# Patient Record
Sex: MALE | Race: WHITE | NOT HISPANIC OR LATINO | Employment: OTHER | URBAN - METROPOLITAN AREA
[De-identification: names, ages, dates, MRNs, and addresses within clinical notes are randomized per-mention and may not be internally consistent; named-entity substitution may affect disease eponyms.]

---

## 2023-06-08 ENCOUNTER — OFFICE VISIT (OUTPATIENT)
Dept: PRIMARY CARE | Facility: CLINIC | Age: 63
End: 2023-06-08
Payer: COMMERCIAL

## 2023-06-08 ENCOUNTER — LAB (OUTPATIENT)
Dept: LAB | Facility: LAB | Age: 63
End: 2023-06-08
Payer: COMMERCIAL

## 2023-06-08 VITALS
RESPIRATION RATE: 16 BRPM | BODY MASS INDEX: 30.62 KG/M2 | HEIGHT: 73 IN | TEMPERATURE: 98 F | WEIGHT: 231 LBS | HEART RATE: 70 BPM | SYSTOLIC BLOOD PRESSURE: 138 MMHG | DIASTOLIC BLOOD PRESSURE: 88 MMHG

## 2023-06-08 DIAGNOSIS — I10 HYPERTENSION, UNSPECIFIED TYPE: ICD-10-CM

## 2023-06-08 DIAGNOSIS — E78.5 HYPERLIPIDEMIA, UNSPECIFIED HYPERLIPIDEMIA TYPE: ICD-10-CM

## 2023-06-08 DIAGNOSIS — E11.9 TYPE 2 DIABETES MELLITUS WITHOUT COMPLICATION, UNSPECIFIED WHETHER LONG TERM INSULIN USE (MULTI): ICD-10-CM

## 2023-06-08 DIAGNOSIS — Z12.5 PROSTATE CANCER SCREENING: ICD-10-CM

## 2023-06-08 DIAGNOSIS — Z12.5 PROSTATE CANCER SCREENING: Primary | ICD-10-CM

## 2023-06-08 DIAGNOSIS — N52.9 ERECTILE DYSFUNCTION, UNSPECIFIED ERECTILE DYSFUNCTION TYPE: ICD-10-CM

## 2023-06-08 PROBLEM — I25.10 CORONARY ARTERY DISEASE: Status: ACTIVE | Noted: 2023-06-08

## 2023-06-08 PROBLEM — L25.9 CONTACT DERMATITIS: Status: ACTIVE | Noted: 2023-06-08

## 2023-06-08 PROBLEM — Z96.659 H/O TOTAL KNEE REPLACEMENT: Status: ACTIVE | Noted: 2023-06-08

## 2023-06-08 PROBLEM — I71.21 ASCENDING AORTIC ANEURYSM (CMS-HCC): Status: ACTIVE | Noted: 2023-06-08

## 2023-06-08 PROBLEM — R91.8 MULTIPLE PULMONARY NODULES: Status: ACTIVE | Noted: 2023-06-08

## 2023-06-08 LAB
ERYTHROCYTE DISTRIBUTION WIDTH (RATIO) BY AUTOMATED COUNT: 13.9 % (ref 11.5–14.5)
ERYTHROCYTE MEAN CORPUSCULAR HEMOGLOBIN CONCENTRATION (G/DL) BY AUTOMATED: 32.8 G/DL (ref 32–36)
ERYTHROCYTE MEAN CORPUSCULAR VOLUME (FL) BY AUTOMATED COUNT: 86 FL (ref 80–100)
ERYTHROCYTES (10*6/UL) IN BLOOD BY AUTOMATED COUNT: 5.15 X10E12/L (ref 4.5–5.9)
HEMATOCRIT (%) IN BLOOD BY AUTOMATED COUNT: 44.5 % (ref 41–52)
HEMOGLOBIN (G/DL) IN BLOOD: 14.6 G/DL (ref 13.5–17.5)
LEUKOCYTES (10*3/UL) IN BLOOD BY AUTOMATED COUNT: 5.9 X10E9/L (ref 4.4–11.3)
PLATELETS (10*3/UL) IN BLOOD AUTOMATED COUNT: 318 X10E9/L (ref 150–450)
POC HEMOGLOBIN A1C: 7.5 % (ref 4.2–6.5)
PROSTATE SPECIFIC AG (NG/ML) IN SER/PLAS: 1.48 NG/ML (ref 0–4)

## 2023-06-08 PROCEDURE — 80053 COMPREHEN METABOLIC PANEL: CPT

## 2023-06-08 PROCEDURE — 80061 LIPID PANEL: CPT

## 2023-06-08 PROCEDURE — 84153 ASSAY OF PSA TOTAL: CPT

## 2023-06-08 PROCEDURE — 99214 OFFICE O/P EST MOD 30 MIN: CPT | Performed by: FAMILY MEDICINE

## 2023-06-08 PROCEDURE — 3079F DIAST BP 80-89 MM HG: CPT | Performed by: FAMILY MEDICINE

## 2023-06-08 PROCEDURE — 83036 HEMOGLOBIN GLYCOSYLATED A1C: CPT | Performed by: FAMILY MEDICINE

## 2023-06-08 PROCEDURE — 85027 COMPLETE CBC AUTOMATED: CPT

## 2023-06-08 PROCEDURE — 1036F TOBACCO NON-USER: CPT | Performed by: FAMILY MEDICINE

## 2023-06-08 PROCEDURE — 36415 COLL VENOUS BLD VENIPUNCTURE: CPT

## 2023-06-08 PROCEDURE — 3075F SYST BP GE 130 - 139MM HG: CPT | Performed by: FAMILY MEDICINE

## 2023-06-08 RX ORDER — EZETIMIBE 10 MG/1
1 TABLET ORAL NIGHTLY
COMMUNITY
Start: 2014-12-09 | End: 2023-06-08 | Stop reason: SDUPTHER

## 2023-06-08 RX ORDER — DILTIAZEM HYDROCHLORIDE 360 MG/1
360 CAPSULE, EXTENDED RELEASE ORAL DAILY
Qty: 90 CAPSULE | Refills: 1 | Status: SHIPPED | OUTPATIENT
Start: 2023-06-08

## 2023-06-08 RX ORDER — METFORMIN HYDROCHLORIDE 500 MG/1
500 TABLET, EXTENDED RELEASE ORAL DAILY
Qty: 90 TABLET | Refills: 1 | Status: SHIPPED | OUTPATIENT
Start: 2023-06-08 | End: 2023-12-07 | Stop reason: SDUPTHER

## 2023-06-08 RX ORDER — FENOFIBRATE 160 MG/1
160 TABLET ORAL DAILY
Qty: 90 TABLET | Refills: 1 | Status: SHIPPED | OUTPATIENT
Start: 2023-06-08 | End: 2023-11-03 | Stop reason: ALTCHOICE

## 2023-06-08 RX ORDER — EZETIMIBE 10 MG/1
10 TABLET ORAL NIGHTLY
Qty: 90 TABLET | Refills: 1 | Status: SHIPPED | OUTPATIENT
Start: 2023-06-08 | End: 2023-11-03 | Stop reason: ALTCHOICE

## 2023-06-08 RX ORDER — FENOFIBRATE 160 MG/1
1 TABLET ORAL DAILY
COMMUNITY
Start: 2014-07-17 | End: 2023-06-08 | Stop reason: SDUPTHER

## 2023-06-08 RX ORDER — DILTIAZEM HYDROCHLORIDE 360 MG/1
1 CAPSULE, EXTENDED RELEASE ORAL DAILY
COMMUNITY
Start: 2014-12-09 | End: 2023-06-08 | Stop reason: SDUPTHER

## 2023-06-08 RX ORDER — VARDENAFIL HYDROCHLORIDE 10 MG/1
10 TABLET ORAL AS NEEDED
Qty: 18 TABLET | Refills: 1 | Status: SHIPPED | OUTPATIENT
Start: 2023-06-08

## 2023-06-08 RX ORDER — METFORMIN HYDROCHLORIDE 500 MG/1
1 TABLET, EXTENDED RELEASE ORAL DAILY
COMMUNITY
Start: 2021-07-15 | End: 2023-06-08 | Stop reason: SDUPTHER

## 2023-06-08 RX ORDER — VARDENAFIL HYDROCHLORIDE 10 MG/1
TABLET ORAL
COMMUNITY
Start: 2015-02-17 | End: 2023-06-08 | Stop reason: SDUPTHER

## 2023-06-08 NOTE — PROGRESS NOTES
Bimal Jimenez is a 63 y.o. male here today for   Chief Complaint   Patient presents with    Diabetes    Hyperlipidemia    Hypertension        HPI     Diabetes recheck -- Patient feeling well.  No CP, numbness of feet, blurred vision, polyuria.  Trying to follow diet.  No side effects from medications.  No hypoglycemic symptoms.      HTN recheck -- Patient denies chest pain, SOB, edema, palpitations on review.  Taking medication correctly and denies any side effects.  Not checking at home.      HLD recheck -- Patient taking medications correctly.  No SE's of muscle pain or joint pain.  No CP, edema, myalgias.      ED - medications works OK.  Not using it very much.      Objective    Visit Vitals  /88   Pulse 70   Temp 36.7 °C (98 °F)   Resp 16     Body mass index is 30.48 kg/m².     Physical Exam     General - Not in acute distress and cooperative.  Build & Nutrition - Well developed  Posture - Normal  Gait - Normal  Mental Status - alert and oriented x 3    Head - Normocephalic    Neck - Thyroid normal size    Eyes - Bilateral - Sclera clear and lids pink without edema or mass.      Skin - Warm and dry with no rashes on visible skin    Lungs - Clear to auscultation and normal breathing effort    Cardiovascular - RRR and no murmurs, rubs or thrill.    Peripheral Vascular - Bilateral - no edema present    Neuropsychiatric - normal mood and affect      Assessment    1. Prostate cancer screening  Prostate Specific Antigen   Appropriate labs ordered or reviewed.   2. Type 2 diabetes mellitus without complication, unspecified whether long term insulin use (CMS/[facility])  POCT glycosylated hemoglobin (Hb A1C) manually resulted, metFORMIN XR (Glucophage-XR) 500 mg 24 hr tablet, Comprehensive Metabolic Panel, CBC, Lipid Panel   Condition well controlled.  No change in current treatment regimen.  Refill given of current medication.  Appropriate labs ordered or reviewed.  Make a follow up appointment with me for recheck in 6  months.   3. Hypertension, unspecified type  dilTIAZem ER (Tiazac) 360 mg 24 hr capsule, Comprehensive Metabolic Panel, CBC, Lipid Panel   Condition well controlled.  No change in current treatment regimen.  Refill given of current medication.  Appropriate labs ordered or reviewed.  Make a follow up appointment with me for recheck in 6 months.   4. Hyperlipidemia, unspecified hyperlipidemia type  ezetimibe (Zetia) 10 mg tablet, fenofibrate (Triglide) 160 mg tablet, Comprehensive Metabolic Panel, CBC, Lipid Panel   Condition well controlled.  No change in current treatment regimen.  Refill given of current medication.  Appropriate labs ordered or reviewed.  Make a follow up appointment with me for recheck in 6 months.   5. Erectile dysfunction, unspecified erectile dysfunction type  vardenafil (Levitra) 10 mg tablet   Condition well controlled.  No change in current treatment regimen.  Refill given of current medication.  Make a follow up appointment with me for recheck in 6 months.

## 2023-06-09 ENCOUNTER — TELEPHONE (OUTPATIENT)
Dept: PRIMARY CARE | Facility: CLINIC | Age: 63
End: 2023-06-09
Payer: COMMERCIAL

## 2023-06-09 LAB
ALANINE AMINOTRANSFERASE (SGPT) (U/L) IN SER/PLAS: 20 U/L (ref 10–52)
ALBUMIN (G/DL) IN SER/PLAS: 4.7 G/DL (ref 3.4–5)
ALKALINE PHOSPHATASE (U/L) IN SER/PLAS: 47 U/L (ref 33–136)
ANION GAP IN SER/PLAS: 14 MMOL/L (ref 10–20)
ASPARTATE AMINOTRANSFERASE (SGOT) (U/L) IN SER/PLAS: 20 U/L (ref 9–39)
BILIRUBIN TOTAL (MG/DL) IN SER/PLAS: 0.5 MG/DL (ref 0–1.2)
CALCIUM (MG/DL) IN SER/PLAS: 9.8 MG/DL (ref 8.6–10.3)
CARBON DIOXIDE, TOTAL (MMOL/L) IN SER/PLAS: 20 MMOL/L (ref 21–32)
CHLORIDE (MMOL/L) IN SER/PLAS: 106 MMOL/L (ref 98–107)
CHOLESTEROL (MG/DL) IN SER/PLAS: 187 MG/DL (ref 0–199)
CHOLESTEROL IN HDL (MG/DL) IN SER/PLAS: 53.6 MG/DL
CHOLESTEROL/HDL RATIO: 3.5
CREATININE (MG/DL) IN SER/PLAS: 0.93 MG/DL (ref 0.5–1.3)
GFR MALE: >90 ML/MIN/1.73M2
GLUCOSE (MG/DL) IN SER/PLAS: 130 MG/DL (ref 74–99)
LDL: 105 MG/DL (ref 0–99)
POTASSIUM (MMOL/L) IN SER/PLAS: 4.3 MMOL/L (ref 3.5–5.3)
PROTEIN TOTAL: 7.3 G/DL (ref 6.4–8.2)
SODIUM (MMOL/L) IN SER/PLAS: 136 MMOL/L (ref 136–145)
TRIGLYCERIDE (MG/DL) IN SER/PLAS: 140 MG/DL (ref 0–149)
UREA NITROGEN (MG/DL) IN SER/PLAS: 19 MG/DL (ref 6–23)
VLDL: 28 MG/DL (ref 0–40)

## 2023-06-09 NOTE — TELEPHONE ENCOUNTER
----- Message from Marco A Wesley MD sent at 6/9/2023  7:39 AM EDT -----  Inform patient of normal results of all recent labs.   Even though some of the lab values may fall outside of the normal range, I do not feel they are clinically concerning or relevant at this time.

## 2023-06-09 NOTE — RESULT ENCOUNTER NOTE
Inform patient of normal results of all recent labs.   Even though some of the lab values may fall outside of the normal range, I do not feel they are clinically concerning or relevant at this time.

## 2023-09-30 ASSESSMENT — PROMIS GLOBAL HEALTH SCALE
RATE_GENERAL_HEALTH: VERY GOOD
CARRYOUT_PHYSICAL_ACTIVITIES: COMPLETELY
RATE_QUALITY_OF_LIFE: VERY GOOD
RATE_MENTAL_HEALTH: EXCELLENT
CARRYOUT_SOCIAL_ACTIVITIES: EXCELLENT
RATE_SOCIAL_SATISFACTION: EXCELLENT
RATE_AVERAGE_PAIN: 1
RATE_AVERAGE_FATIGUE: MILD
RATE_PHYSICAL_HEALTH: GOOD
EMOTIONAL_PROBLEMS: NEVER

## 2023-10-05 ENCOUNTER — OFFICE VISIT (OUTPATIENT)
Dept: PRIMARY CARE | Facility: CLINIC | Age: 63
End: 2023-10-05
Payer: COMMERCIAL

## 2023-10-05 VITALS
WEIGHT: 231 LBS | RESPIRATION RATE: 16 BRPM | OXYGEN SATURATION: 96 % | TEMPERATURE: 98 F | SYSTOLIC BLOOD PRESSURE: 128 MMHG | HEART RATE: 78 BPM | BODY MASS INDEX: 30.62 KG/M2 | HEIGHT: 73 IN | DIASTOLIC BLOOD PRESSURE: 82 MMHG

## 2023-10-05 DIAGNOSIS — Z00.00 ENCOUNTER FOR PREVENTIVE HEALTH EXAMINATION: Primary | ICD-10-CM

## 2023-10-05 DIAGNOSIS — R91.8 MULTIPLE PULMONARY NODULES: ICD-10-CM

## 2023-10-05 PROBLEM — E11.9 TYPE 2 DIABETES MELLITUS WITHOUT COMPLICATION, WITHOUT LONG-TERM CURRENT USE OF INSULIN (MULTI): Status: RESOLVED | Noted: 2023-06-08 | Resolved: 2023-10-05

## 2023-10-05 PROBLEM — E78.2 MIXED HYPERLIPIDEMIA: Status: ACTIVE | Noted: 2023-06-08

## 2023-10-05 PROCEDURE — 3079F DIAST BP 80-89 MM HG: CPT | Performed by: FAMILY MEDICINE

## 2023-10-05 PROCEDURE — 99396 PREV VISIT EST AGE 40-64: CPT | Performed by: FAMILY MEDICINE

## 2023-10-05 PROCEDURE — 1036F TOBACCO NON-USER: CPT | Performed by: FAMILY MEDICINE

## 2023-10-05 PROCEDURE — 3074F SYST BP LT 130 MM HG: CPT | Performed by: FAMILY MEDICINE

## 2023-10-05 ASSESSMENT — PATIENT HEALTH QUESTIONNAIRE - PHQ9
SUM OF ALL RESPONSES TO PHQ9 QUESTIONS 1 AND 2: 0
2. FEELING DOWN, DEPRESSED OR HOPELESS: NOT AT ALL
1. LITTLE INTEREST OR PLEASURE IN DOING THINGS: NOT AT ALL

## 2023-10-05 NOTE — PROGRESS NOTES
Bimal Jimenez is a 63 y.o. male here today a periodic health exam.  I reviewed previous preventative health measures including screening tests, immunizations and labs.      HPI   CURRENT COMPLAINTS OR CONCERNS:  None.        PREVIOUS PREVENTATIVE HEALTH  PSA :  YES -- DATE 6/8/2023  Colonoscopy : YES -- DATE 4/24/2019  Cologuard :  NO  Hepatitis C Antibody :  NO  HIV Screening : NO  Prevnar/Pneumovax : YES -- DATE 11/25/2015 Pneumovax 23  Tdap : YES -- DATE 11/25/2015    Shingrix : YES -- DATE 8/21/2022 and 11/13/2022  Yearly Flu Shot :  YES    CURRENT FINDINGS  Healthy Diet : YES  Exercise :  YES --  very busy with yard work, walks a lot.   Bikes.    Depression or Anxiety Issues : NO  Alcohol Use : NO  Tobacco Use : NO.  Quit in 2002.    Drug Use : NO          Current Outpatient Medications:     dilTIAZem ER (Tiazac) 360 mg 24 hr capsule, Take 1 capsule (360 mg) by mouth once daily., Disp: 90 capsule, Rfl: 1    ezetimibe (Zetia) 10 mg tablet, Take 1 tablet (10 mg) by mouth once daily at bedtime., Disp: 90 tablet, Rfl: 1    fenofibrate (Triglide) 160 mg tablet, Take 1 tablet (160 mg) by mouth once daily., Disp: 90 tablet, Rfl: 1    metFORMIN XR (Glucophage-XR) 500 mg 24 hr tablet, Take 1 tablet (500 mg) by mouth once daily., Disp: 90 tablet, Rfl: 1    vardenafil (Levitra) 10 mg tablet, Take 1 tablet (10 mg) by mouth if needed for erectile dysfunction., Disp: 18 tablet, Rfl: 1    Past Medical History:   Diagnosis Date    Calculus of bile duct without cholangitis or cholecystitis without obstruction 03/03/2017    Biliary colic    Other specified postprocedural states     Status post hernia repair    Personal history of other diseases of the circulatory system     History of cardiac disorder    Personal history of other diseases of the musculoskeletal system and connective tissue     History of arthritis    Personal history of other diseases of the musculoskeletal system and connective tissue     History of backache     Personal history of other infectious and parasitic diseases     History of varicella    Personal history of other infectious and parasitic diseases     History of mumps    Personal history of other infectious and parasitic diseases     History of Legionnaire's disease    Personal history of other infectious and parasitic diseases     History of measles    Personal history of pneumonia (recurrent)     History of pneumonia    Personal history of pulmonary embolism     History of pulmonary embolism    Umbilical hernia without obstruction or gangrene 12/07/2015    Umbilical hernia       Past Surgical History:   Procedure Laterality Date    BACK SURGERY  03/03/2017    Back Surgery    CHOLECYSTECTOMY  06/07/2022    Cholecystectomy Laparoscopic    KNEE SURGERY  03/03/2017    Knee Surgery    OTHER SURGICAL HISTORY  01/21/2016    Umbilical Hernia Herniorrhaphy    SHOULDER SURGERY  03/03/2017    Shoulder Surgery       No family history on file.    Social History     Tobacco Use    Smoking status: Former     Types: Cigarettes     Passive exposure: Past    Smokeless tobacco: Never   Substance Use Topics    Alcohol use: Never    Drug use: Never       Immunization History   Administered Date(s) Administered    Influenza, injectable, MDCK, quadrivalent 10/12/2022    Influenza, seasonal, injectable 10/17/2018, 06/03/2021    Mumps 11/01/2015    Pfizer Purple Cap SARS-CoV-2 02/19/2021, 03/12/2021, 12/15/2021    Pneumococcal polysaccharide vaccine, 23-valent, age 2 years and older (PNEUMOVAX 23) 11/25/2015    Tdap vaccine, age 7 year and older (BOOSTRIX) 11/25/2015    Zoster vaccine, recombinant, adult (SHINGRIX) 08/21/2022, 11/13/2022         Objective    Visit Vitals  /82   Pulse 78   Temp 36.7 °C (98 °F)   Resp 16       Physical Exam  Vitals and nursing note reviewed.   Constitutional:       General: He is not in acute distress.     Appearance: Normal appearance.   HENT:      Head: Normocephalic and atraumatic.      Right  Ear: Tympanic membrane, ear canal and external ear normal.      Left Ear: Tympanic membrane, ear canal and external ear normal.      Nose: Nose normal.      Mouth/Throat:      Mouth: Mucous membranes are moist.      Pharynx: Oropharynx is clear.   Eyes:      Extraocular Movements: Extraocular movements intact.      Conjunctiva/sclera: Conjunctivae normal.      Pupils: Pupils are equal, round, and reactive to light.   Cardiovascular:      Rate and Rhythm: Normal rate and regular rhythm.      Pulses: Normal pulses.      Heart sounds: Normal heart sounds. No murmur heard.     No friction rub. No gallop.   Pulmonary:      Effort: Pulmonary effort is normal. No respiratory distress.      Breath sounds: Normal breath sounds.   Abdominal:      General: Abdomen is flat. Bowel sounds are normal. There is no distension.      Palpations: Abdomen is soft.      Tenderness: There is no abdominal tenderness.   Musculoskeletal:         General: Normal range of motion.      Cervical back: Normal range of motion and neck supple.   Lymphadenopathy:      Cervical: No cervical adenopathy.   Skin:     General: Skin is warm and dry.      Findings: No lesion or rash.   Neurological:      General: No focal deficit present.      Mental Status: He is alert. Mental status is at baseline.   Psychiatric:         Mood and Affect: Mood normal.         Behavior: Behavior normal.         Thought Content: Thought content normal.         Judgment: Judgment normal.            Assessment    1. Encounter for preventive health examination     Recommend regular exercise, balanced diet, regular dental exams, and healthy habits.  Appropriate labs ordered or reviewed.  I recommend to eat plenty of plant foods (such as whole-grain products, fruits, and vegetables) and a moderate amount of lean and low-fat, animal-based food (meat and dairy products).  When shopping, choose lean meats, fish, and poultry. I recommend to increase aerobic exercise.  He already  received a flu shot this year.  His other immunizations are up-to-date.  He had a colonoscopy on 4/24/2019 and this was scanned into his chart.  I recommend a yearly flu shot in the fall and I recommend a yearly wellness exam.     2. Multiple pulmonary nodules  CT lung screening follow up CT chest wo IV contrast   His last CT scan was in 2021 so he is due for a recheck of a few small pulmonary nodules.  I have ordered a follow-up CT lung screening.  We will call him with results.

## 2023-10-09 ENCOUNTER — APPOINTMENT (OUTPATIENT)
Dept: RADIOLOGY | Facility: CLINIC | Age: 63
End: 2023-10-09
Payer: COMMERCIAL

## 2023-10-10 ENCOUNTER — ANCILLARY PROCEDURE (OUTPATIENT)
Dept: RADIOLOGY | Facility: CLINIC | Age: 63
End: 2023-10-10
Payer: COMMERCIAL

## 2023-10-10 DIAGNOSIS — R91.8 MULTIPLE PULMONARY NODULES: ICD-10-CM

## 2023-10-10 PROCEDURE — 71250 CT THORAX DX C-: CPT

## 2023-10-10 PROCEDURE — 71250 CT THORAX DX C-: CPT | Performed by: RADIOLOGY

## 2023-10-11 ENCOUNTER — TELEPHONE (OUTPATIENT)
Dept: PRIMARY CARE | Facility: CLINIC | Age: 63
End: 2023-10-11
Payer: COMMERCIAL

## 2023-10-11 NOTE — PROGRESS NOTES
Bimal Jimenez is a 63 y.o. male here today for No chief complaint on file.       HPI         Current Outpatient Medications:     dilTIAZem ER (Tiazac) 360 mg 24 hr capsule, Take 1 capsule (360 mg) by mouth once daily., Disp: 90 capsule, Rfl: 1    ezetimibe (Zetia) 10 mg tablet, Take 1 tablet (10 mg) by mouth once daily at bedtime., Disp: 90 tablet, Rfl: 1    fenofibrate (Triglide) 160 mg tablet, Take 1 tablet (160 mg) by mouth once daily., Disp: 90 tablet, Rfl: 1    metFORMIN XR (Glucophage-XR) 500 mg 24 hr tablet, Take 1 tablet (500 mg) by mouth once daily., Disp: 90 tablet, Rfl: 1    vardenafil (Levitra) 10 mg tablet, Take 1 tablet (10 mg) by mouth if needed for erectile dysfunction., Disp: 18 tablet, Rfl: 1    Patient Active Problem List   Diagnosis    Ascending aortic aneurysm (CMS/HCC)    Contact dermatitis    Coronary artery disease    Erectile dysfunction    H/O total knee replacement    Mixed hyperlipidemia    Primary hypertension    Multiple pulmonary nodules    Prostate cancer screening         No results found for this or any previous visit (from the past 672 hour(s)).     Objective    Visit Vitals  There were no vitals taken for this visit.    There is no height or weight on file to calculate BMI.     Physical Exam       Assessment    No diagnosis found.

## 2023-10-11 NOTE — TELEPHONE ENCOUNTER
Pt was informed and understood. Pt stated he was concerned to see severe coronary artery calcification in the report. Advise.

## 2023-10-11 NOTE — RESULT ENCOUNTER NOTE
Please inform the patient that his repeat CT scan of the lungs was stable.  The previous nodules have not changed or grown in size and they recommend that we repeat his CT scan in 1 year to make sure there are no changes.

## 2023-11-03 ENCOUNTER — TELEPHONE (OUTPATIENT)
Dept: CARDIOLOGY | Facility: HOSPITAL | Age: 63
End: 2023-11-03

## 2023-11-03 ENCOUNTER — OFFICE VISIT (OUTPATIENT)
Dept: CARDIOLOGY | Facility: CLINIC | Age: 63
End: 2023-11-03
Payer: COMMERCIAL

## 2023-11-03 VITALS
OXYGEN SATURATION: 96 % | WEIGHT: 228 LBS | SYSTOLIC BLOOD PRESSURE: 150 MMHG | HEART RATE: 83 BPM | BODY MASS INDEX: 30.22 KG/M2 | HEIGHT: 73 IN | DIASTOLIC BLOOD PRESSURE: 88 MMHG

## 2023-11-03 DIAGNOSIS — I10 PRIMARY HYPERTENSION: ICD-10-CM

## 2023-11-03 DIAGNOSIS — E78.2 MIXED HYPERLIPIDEMIA: ICD-10-CM

## 2023-11-03 DIAGNOSIS — I25.118 CORONARY ARTERY DISEASE OF NATIVE ARTERY OF NATIVE HEART WITH STABLE ANGINA PECTORIS (CMS-HCC): Primary | ICD-10-CM

## 2023-11-03 PROCEDURE — 93005 ELECTROCARDIOGRAM TRACING: CPT | Performed by: INTERNAL MEDICINE

## 2023-11-03 PROCEDURE — 3077F SYST BP >= 140 MM HG: CPT | Performed by: INTERNAL MEDICINE

## 2023-11-03 PROCEDURE — 99204 OFFICE O/P NEW MOD 45 MIN: CPT | Performed by: INTERNAL MEDICINE

## 2023-11-03 PROCEDURE — 3079F DIAST BP 80-89 MM HG: CPT | Performed by: INTERNAL MEDICINE

## 2023-11-03 PROCEDURE — 93010 ELECTROCARDIOGRAM REPORT: CPT | Performed by: INTERNAL MEDICINE

## 2023-11-03 PROCEDURE — 99214 OFFICE O/P EST MOD 30 MIN: CPT | Performed by: INTERNAL MEDICINE

## 2023-11-03 PROCEDURE — 1036F TOBACCO NON-USER: CPT | Performed by: INTERNAL MEDICINE

## 2023-11-03 RX ORDER — NAPROXEN SODIUM 220 MG/1
81 TABLET, FILM COATED ORAL DAILY
Qty: 30 TABLET | Refills: 11 | Status: SHIPPED | OUTPATIENT
Start: 2023-11-03 | End: 2024-11-02

## 2023-11-03 RX ORDER — ROSUVASTATIN CALCIUM 20 MG/1
20 TABLET, COATED ORAL DAILY
Qty: 30 TABLET | Refills: 11 | Status: SHIPPED | OUTPATIENT
Start: 2023-11-03 | End: 2023-11-24 | Stop reason: SDUPTHER

## 2023-11-03 RX ORDER — ATENOLOL 100 MG/1
100 TABLET ORAL ONCE
Qty: 1 TABLET | Refills: 0 | Status: SHIPPED | OUTPATIENT
Start: 2023-11-03 | End: 2023-11-27 | Stop reason: ALTCHOICE

## 2023-11-03 ASSESSMENT — PAIN SCALES - GENERAL: PAINLEVEL: 0-NO PAIN

## 2023-11-03 ASSESSMENT — ENCOUNTER SYMPTOMS
OCCASIONAL FEELINGS OF UNSTEADINESS: 0
DEPRESSION: 0
LOSS OF SENSATION IN FEET: 1

## 2023-11-03 NOTE — PROGRESS NOTES
Name : Ryan Jimenez    : 1960   MRN : [id number]   ENC Date : 23     Reason for visit:  chest pain and severe coronary calcium seen on chest CT.    Assessment and Plan:    Coronary artery disease: Patient has some atypical chest pain but significant coronary artery calcification noted on chest CT  done for monitoring of pulmonary nodules.  He is unable to exercise due to prior knee surgery.  Therefore I think the next best test would be a coronary CTA to definitively define any potential area of stenosis.  Patient was agreeable with that plan.  He will take atenolol the morning of the procedure but hold his diltiazem.  He will have his blood work checked prior to the procedure.   We reviewed the change in guidelines regarding aspirin for primary prevention of MI and stroke.  Given the extensive nature of his calcification and no prior bleeding episodes I think aspirin 81 mg daily provides benefit above and beyond any potential risk.  I recommended he begin this.  Dyslipidemia: Patient had some intolerance to atorvastatin.  Given the extensive calcification I really think we should try him on another statin before saying he cannot take these medications.  There is no outcomes data with fenofibrate or Zetia and I really believe we should try to see if he can tolerate a statin.  He was agreeable to that.  I discontinued those medications and started him on rosuvastatin 20 mg daily.  If he is unable to tolerate rosuvastatin we should look at a PCS K-9 inhibitor therapy instead.  Hypertension: Blood pressure was slightly elevated today but is typically under good control.  We will need to recheck this in the future.   Given he has had an elevated hemoglobin A1c and is on metformin it would probably be wise to add an ACE inhibitor.  Disp:   Phone follow-up regarding coronary CTA results or see any office if we need to discuss  cardiac catheterization.      HPI:    Patient is seen today for evaluation of  significant coronary calcification noted at the time of a lung nodule follow-up CT scan.  Patient describes some mild chest fullness from time to time.  He also describes his wife telling him he breathes heavy from time to time.  No real significant burning stabbing or crushing chest discomfort.  He is able to do most activities at home.  Unfortunately he has had prior knee surgery and is unable to run/exercise on a treadmill.  he apparently had a heart catheterization done several years ago which showed some mild plaque disease.  At some point he was placed on atorvastatin and did not tolerate it but cannot relate remember exactly what the intolerance was.  He was switched to fenofibrate and Zetia and has been on that for several years.  He was never tried on another statin as far as he can recall.  No TIA or CVA-like symptoms.  No orthopnea nor PND.  No claudication-like symptoms.      Problem List:   Patient Active Problem List   Diagnosis    Ascending aortic aneurysm (CMS/HCC)    Contact dermatitis    Coronary artery disease    Erectile dysfunction    H/O total knee replacement    Mixed hyperlipidemia    Primary hypertension    Multiple pulmonary nodules    Prostate cancer screening        Meds:   Current Outpatient Medications on File Prior to Visit   Medication Sig Dispense Refill    dilTIAZem ER (Tiazac) 360 mg 24 hr capsule Take 1 capsule (360 mg) by mouth once daily. 90 capsule 1    metFORMIN XR (Glucophage-XR) 500 mg 24 hr tablet Take 1 tablet (500 mg) by mouth once daily. 90 tablet 1    vardenafil (Levitra) 10 mg tablet Take 1 tablet (10 mg) by mouth if needed for erectile dysfunction. 18 tablet 1    [DISCONTINUED] ezetimibe (Zetia) 10 mg tablet Take 1 tablet (10 mg) by mouth once daily at bedtime. 90 tablet 1    [DISCONTINUED] fenofibrate (Triglide) 160 mg tablet Take 1 tablet (160 mg) by mouth once daily. 90 tablet 1     No current facility-administered medications on file prior to visit.       All:  "  Allergies   Allergen Reactions    Bee Pollen Unknown       Fam Hx: No family history on file.    Soc Hx:   Social History     Socioeconomic History    Marital status:      Spouse name: Not on file    Number of children: Not on file    Years of education: Not on file    Highest education level: Not on file   Occupational History    Not on file   Tobacco Use    Smoking status: Former     Types: Cigarettes     Passive exposure: Past    Smokeless tobacco: Never   Substance and Sexual Activity    Alcohol use: Never    Drug use: Never    Sexual activity: Not on file   Other Topics Concern    Not on file   Social History Narrative    Not on file     Social Determinants of Health     Financial Resource Strain: Not on file   Food Insecurity: Not on file   Transportation Needs: Not on file   Physical Activity: Not on file   Stress: Not on file   Social Connections: Not on file   Intimate Partner Violence: Not on file   Housing Stability: Not on file       ROS    VS: /88 (BP Location: Left arm, Patient Position: Sitting)   Pulse 83   Ht 1.854 m (6' 1\")   Wt 103 kg (228 lb)   SpO2 96%   BMI 30.08 kg/m²      Physical Exam     No results found for this or any previous visit from the past 1095 days.     No echocardiogram results found for the past 12 months  No results found for this or any previous visit (from the past 4464 hour(s)).         Kvng Alonzo MD   " Walk in

## 2023-11-07 LAB
ATRIAL RATE: 74 BPM
P AXIS: -13 DEGREES
P OFFSET: 171 MS
P ONSET: 138 MS
PR INTERVAL: 148 MS
Q ONSET: 212 MS
QRS COUNT: 12 BEATS
QRS DURATION: 90 MS
QT INTERVAL: 382 MS
QTC CALCULATION(BAZETT): 424 MS
QTC FREDERICIA: 410 MS
R AXIS: -32 DEGREES
T AXIS: 10 DEGREES
T OFFSET: 403 MS
VENTRICULAR RATE: 74 BPM

## 2023-11-21 ENCOUNTER — LAB (OUTPATIENT)
Dept: LAB | Facility: LAB | Age: 63
End: 2023-11-21
Payer: COMMERCIAL

## 2023-11-21 DIAGNOSIS — I25.118 CORONARY ARTERY DISEASE OF NATIVE ARTERY OF NATIVE HEART WITH STABLE ANGINA PECTORIS (CMS-HCC): ICD-10-CM

## 2023-11-21 LAB
ANION GAP SERPL CALC-SCNC: 13 MMOL/L (ref 10–20)
BUN SERPL-MCNC: 13 MG/DL (ref 6–23)
CALCIUM SERPL-MCNC: 9.6 MG/DL (ref 8.6–10.3)
CHLORIDE SERPL-SCNC: 107 MMOL/L (ref 98–107)
CO2 SERPL-SCNC: 22 MMOL/L (ref 21–32)
CREAT SERPL-MCNC: 0.83 MG/DL (ref 0.5–1.3)
GFR SERPL CREATININE-BSD FRML MDRD: >90 ML/MIN/1.73M*2
GLUCOSE SERPL-MCNC: 146 MG/DL (ref 74–99)
POTASSIUM SERPL-SCNC: 4.4 MMOL/L (ref 3.5–5.3)
SODIUM SERPL-SCNC: 138 MMOL/L (ref 136–145)

## 2023-11-21 PROCEDURE — 80048 BASIC METABOLIC PNL TOTAL CA: CPT

## 2023-11-21 PROCEDURE — 36415 COLL VENOUS BLD VENIPUNCTURE: CPT

## 2023-11-24 ENCOUNTER — ANCILLARY PROCEDURE (OUTPATIENT)
Dept: RADIOLOGY | Facility: CLINIC | Age: 63
End: 2023-11-24
Payer: COMMERCIAL

## 2023-11-24 VITALS
HEIGHT: 73 IN | DIASTOLIC BLOOD PRESSURE: 68 MMHG | BODY MASS INDEX: 30.22 KG/M2 | RESPIRATION RATE: 16 BRPM | SYSTOLIC BLOOD PRESSURE: 134 MMHG | WEIGHT: 228 LBS | OXYGEN SATURATION: 98 % | HEART RATE: 59 BPM

## 2023-11-24 DIAGNOSIS — I25.118 CORONARY ARTERY DISEASE OF NATIVE ARTERY OF NATIVE HEART WITH STABLE ANGINA PECTORIS (CMS-HCC): ICD-10-CM

## 2023-11-24 PROCEDURE — 75574 CT ANGIO HRT W/3D IMAGE: CPT | Performed by: INTERNAL MEDICINE

## 2023-11-24 PROCEDURE — 2550000001 HC RX 255 CONTRASTS: Performed by: INTERNAL MEDICINE

## 2023-11-24 PROCEDURE — 2500000001 HC RX 250 WO HCPCS SELF ADMINISTERED DRUGS (ALT 637 FOR MEDICARE OP)

## 2023-11-24 PROCEDURE — 75574 CT ANGIO HRT W/3D IMAGE: CPT

## 2023-11-24 RX ORDER — NITROGLYCERIN 400 UG/1
2 SPRAY ORAL ONCE
Status: COMPLETED | OUTPATIENT
Start: 2023-11-24 | End: 2023-11-24

## 2023-11-24 RX ADMIN — NITROGLYCERIN 2 SPRAY: 400 SPRAY ORAL at 08:09

## 2023-11-24 RX ADMIN — IOHEXOL 80 ML: 350 INJECTION, SOLUTION INTRAVENOUS at 08:33

## 2023-11-24 NOTE — NURSING NOTE
Post CCTA pt c/o slight dizziness that  improved with caffeine, denies headache. Steady on feet, skin warm pink and dry. Pt verbalized understanding of increased water intake x24 hours, educated on side effects of medications. HL removed with tip intact, manual pressure held until hemostasis achieved, 2x2 and coban to site. Pt DC home to self care with his wife.

## 2023-11-27 ENCOUNTER — TELEPHONE (OUTPATIENT)
Dept: CARDIOLOGY | Facility: HOSPITAL | Age: 63
End: 2023-11-27
Payer: COMMERCIAL

## 2023-11-27 RX ORDER — ROSUVASTATIN CALCIUM 20 MG/1
20 TABLET, COATED ORAL DAILY
Qty: 90 TABLET | Refills: 3 | Status: SHIPPED | OUTPATIENT
Start: 2023-11-27 | End: 2024-11-26

## 2023-11-27 NOTE — TELEPHONE ENCOUNTER
I called patient with the results of his coronary CTA.  It shows mild diffuse coronary artery disease with a moderately elevated coronary calcium score.  Cardiac catheterization is not warranted.  He is having some muscle cramps with rosuvastatin.  I told him to stick with it.  He can try hydrating himself before bed as well as taking some over-the-counter magnesium.  Hopefully he will tolerate it well.  If he does not then we might have to consider PCS K-9 inhibitor therapy.    I recommended the Mediterranean diet for long-term cardiovascular care.    Marci please schedule RTO in 6 months, then save to chart    SDH

## 2023-12-07 ENCOUNTER — OFFICE VISIT (OUTPATIENT)
Dept: PRIMARY CARE | Facility: CLINIC | Age: 63
End: 2023-12-07
Payer: COMMERCIAL

## 2023-12-07 VITALS
WEIGHT: 231 LBS | RESPIRATION RATE: 16 BRPM | TEMPERATURE: 98 F | DIASTOLIC BLOOD PRESSURE: 82 MMHG | HEART RATE: 72 BPM | BODY MASS INDEX: 30.62 KG/M2 | HEIGHT: 73 IN | SYSTOLIC BLOOD PRESSURE: 128 MMHG

## 2023-12-07 DIAGNOSIS — E78.2 MIXED HYPERLIPIDEMIA: Primary | ICD-10-CM

## 2023-12-07 DIAGNOSIS — E11.9 TYPE 2 DIABETES MELLITUS WITHOUT COMPLICATION, WITHOUT LONG-TERM CURRENT USE OF INSULIN (MULTI): ICD-10-CM

## 2023-12-07 DIAGNOSIS — I10 PRIMARY HYPERTENSION: ICD-10-CM

## 2023-12-07 LAB — POC HEMOGLOBIN A1C: 7.9 % (ref 4.2–6.5)

## 2023-12-07 PROCEDURE — 99214 OFFICE O/P EST MOD 30 MIN: CPT | Performed by: FAMILY MEDICINE

## 2023-12-07 PROCEDURE — 1036F TOBACCO NON-USER: CPT | Performed by: FAMILY MEDICINE

## 2023-12-07 PROCEDURE — 3074F SYST BP LT 130 MM HG: CPT | Performed by: FAMILY MEDICINE

## 2023-12-07 PROCEDURE — 83036 HEMOGLOBIN GLYCOSYLATED A1C: CPT | Performed by: FAMILY MEDICINE

## 2023-12-07 PROCEDURE — 3079F DIAST BP 80-89 MM HG: CPT | Performed by: FAMILY MEDICINE

## 2023-12-07 RX ORDER — METFORMIN HYDROCHLORIDE 500 MG/1
500 TABLET, EXTENDED RELEASE ORAL
Qty: 180 TABLET | Refills: 1 | Status: SHIPPED | OUTPATIENT
Start: 2023-12-07

## 2023-12-07 NOTE — PROGRESS NOTES
"Ryan Jimenez \"Ivan" is a 63 y.o. male here today for   Chief Complaint   Patient presents with    Hypertension    Diabetes        HPI   Recheck hyperlipidemia and abnormal calcifications on CT of coronary arteries - He saw Dr. Alonzo and switched to rosuvastatin and d/carl Zetia and fenofibrate.  No problems at all with rosuvastatin.  May do cardiac CT in 6 months.  I reviewed the note from Dr. Alonzo.    Diabetes recheck -- Patient feeling well.  No CP, numbness of feet, blurred vision, polyuria.  Trying to follow diet.  No side effects from medications.  No hypoglycemic symptoms.   A1C is higher today at 7.9.  He says he has been eating more carbohydrates and sweets.  His weight has increased slightly over the last few months.      Current Outpatient Medications:     aspirin 81 mg chewable tablet, Chew 1 tablet (81 mg) once daily., Disp: 30 tablet, Rfl: 11    dilTIAZem ER (Tiazac) 360 mg 24 hr capsule, Take 1 capsule (360 mg) by mouth once daily., Disp: 90 capsule, Rfl: 1    rosuvastatin (Crestor) 20 mg tablet, Take 1 tablet (20 mg) by mouth once daily., Disp: 90 tablet, Rfl: 3    vardenafil (Levitra) 10 mg tablet, Take 1 tablet (10 mg) by mouth if needed for erectile dysfunction., Disp: 18 tablet, Rfl: 1    metFORMIN  mg 24 hr tablet, Take 1 tablet (500 mg) by mouth 2 times a day with meals., Disp: 180 tablet, Rfl: 1    Patient Active Problem List   Diagnosis    Ascending aortic aneurysm (CMS/HCC)    Contact dermatitis    Coronary artery disease    Type 2 diabetes mellitus without complication, without long-term current use of insulin (CMS/HCC)    Erectile dysfunction    H/O total knee replacement    Mixed hyperlipidemia    Primary hypertension    Multiple pulmonary nodules    Prostate cancer screening         Recent Results (from the past 672 hour(s))   Basic Metabolic Panel    Collection Time: 11/21/23  9:50 AM   Result Value Ref Range    Glucose 146 (H) 74 - 99 mg/dL    Sodium 138 136 - 145 mmol/L    " "Potassium 4.4 3.5 - 5.3 mmol/L    Chloride 107 98 - 107 mmol/L    Bicarbonate 22 21 - 32 mmol/L    Anion Gap 13 10 - 20 mmol/L    Urea Nitrogen 13 6 - 23 mg/dL    Creatinine 0.83 0.50 - 1.30 mg/dL    eGFR >90 >60 mL/min/1.73m*2    Calcium 9.6 8.6 - 10.3 mg/dL   POCT glycosylated hemoglobin (Hb A1C) manually resulted    Collection Time: 12/07/23  8:24 AM   Result Value Ref Range    POC HEMOGLOBIN A1c 7.9 (A) 4.2 - 6.5 %        Objective    Visit Vitals  /82   Pulse 72   Temp 36.7 °C (98 °F)   Resp 16   Ht 1.854 m (6' 1\")   Wt 105 kg (231 lb)   BMI 30.48 kg/m²     Body mass index is 30.48 kg/m².     Physical Exam   General - Not in acute distress and cooperative.  Build & Nutrition - Well developed  Posture - Normal  Gait - Normal  Mental Status - alert and oriented x 3    Head - Normocephalic    Neck - Thyroid normal size    Eyes - Bilateral - Sclera clear and lids pink without edema or mass.      Skin - Warm and dry with no rashes on visible skin    Lungs - Clear to auscultation and normal breathing effort    Cardiovascular - RRR and no murmurs, rubs or thrill.    Peripheral Vascular - Bilateral - no edema present    Neuropsychiatric - normal mood and affect        Assessment    1. Mixed hyperlipidemia     He was switched to rosuvastatin by Dr. Alonzo as above.  He is tolerating it well and he will get follow-up labs in the future.     2. Type 2 diabetes mellitus without complication, without long-term current use of insulin (CMS/[facility])  POCT glycosylated hemoglobin (Hb A1C) manually resulted, metFORMIN  mg 24 hr tablet   Suboptimal control of condition.  Will modify treatment by increasing metformin to 500 mg twice daily.  We discussed his diet and how he needs to decrease carbohydrate intake and work on his weight.  We will recheck in 3 months and if not well-controlled I may consider adding Jardiance.     3. Primary hypertension     Condition well controlled.    No change in current treatment " regimen.

## 2024-03-08 ENCOUNTER — OFFICE VISIT (OUTPATIENT)
Dept: PRIMARY CARE | Facility: CLINIC | Age: 64
End: 2024-03-08
Payer: COMMERCIAL

## 2024-03-08 ENCOUNTER — LAB (OUTPATIENT)
Dept: LAB | Facility: LAB | Age: 64
End: 2024-03-08
Payer: COMMERCIAL

## 2024-03-08 VITALS
WEIGHT: 228 LBS | DIASTOLIC BLOOD PRESSURE: 82 MMHG | SYSTOLIC BLOOD PRESSURE: 144 MMHG | RESPIRATION RATE: 16 BRPM | BODY MASS INDEX: 30.22 KG/M2 | TEMPERATURE: 98.1 F | HEART RATE: 72 BPM | HEIGHT: 73 IN

## 2024-03-08 DIAGNOSIS — R63.5 WEIGHT GAIN: ICD-10-CM

## 2024-03-08 DIAGNOSIS — E11.9 TYPE 2 DIABETES MELLITUS WITHOUT COMPLICATION, WITHOUT LONG-TERM CURRENT USE OF INSULIN (MULTI): ICD-10-CM

## 2024-03-08 DIAGNOSIS — E78.2 MIXED HYPERLIPIDEMIA: Primary | ICD-10-CM

## 2024-03-08 DIAGNOSIS — I10 PRIMARY HYPERTENSION: ICD-10-CM

## 2024-03-08 LAB
POC HEMOGLOBIN A1C: 8.3 % (ref 4.2–6.5)
TSH SERPL-ACNC: 1.61 MIU/L (ref 0.44–3.98)

## 2024-03-08 PROCEDURE — 1036F TOBACCO NON-USER: CPT | Performed by: FAMILY MEDICINE

## 2024-03-08 PROCEDURE — 83036 HEMOGLOBIN GLYCOSYLATED A1C: CPT | Performed by: FAMILY MEDICINE

## 2024-03-08 PROCEDURE — 84443 ASSAY THYROID STIM HORMONE: CPT

## 2024-03-08 PROCEDURE — 3077F SYST BP >= 140 MM HG: CPT | Performed by: FAMILY MEDICINE

## 2024-03-08 PROCEDURE — 3079F DIAST BP 80-89 MM HG: CPT | Performed by: FAMILY MEDICINE

## 2024-03-08 PROCEDURE — 99214 OFFICE O/P EST MOD 30 MIN: CPT | Performed by: FAMILY MEDICINE

## 2024-03-08 PROCEDURE — 36415 COLL VENOUS BLD VENIPUNCTURE: CPT

## 2024-03-08 NOTE — PROGRESS NOTES
"Ryan Jimenez \"Ivan" is a 64 y.o. male here today for   Chief Complaint   Patient presents with    Diabetes        Diabetes  He presents for his follow-up diabetic visit. He has type 2 diabetes mellitus.      Diabetes recheck -- Patient feeling well.  No CP, numbness of feet, blurred vision, polyuria.  Trying to follow diet.  No side effects from medications.  No hypoglycemic symptoms.  His A1c is actually higher today at 8.3.  We had increased his metformin up to twice daily at his last visit 3 months ago because his A1c was 7.9.  His weight is down 3 pounds since his last visit.  He has been watching diet a little better.  Less candy and more salad.      FH of low thyroid.        Current Outpatient Medications:     aspirin 81 mg chewable tablet, Chew 1 tablet (81 mg) once daily., Disp: 30 tablet, Rfl: 11    dilTIAZem ER (Tiazac) 360 mg 24 hr capsule, Take 1 capsule (360 mg) by mouth once daily., Disp: 90 capsule, Rfl: 1    metFORMIN  mg 24 hr tablet, Take 1 tablet (500 mg) by mouth 2 times a day with meals., Disp: 180 tablet, Rfl: 1    rosuvastatin (Crestor) 20 mg tablet, Take 1 tablet (20 mg) by mouth once daily., Disp: 90 tablet, Rfl: 3    vardenafil (Levitra) 10 mg tablet, Take 1 tablet (10 mg) by mouth if needed for erectile dysfunction., Disp: 18 tablet, Rfl: 1    empagliflozin (Jardiance) 10 mg, Take 1 tablet (10 mg) by mouth once daily., Disp: 90 tablet, Rfl: 1    Patient Active Problem List   Diagnosis    Ascending aortic aneurysm (CMS/HCC)    Contact dermatitis    Coronary artery disease    Type 2 diabetes mellitus without complication, without long-term current use of insulin (CMS/HCC)    Erectile dysfunction    H/O total knee replacement    Mixed hyperlipidemia    Primary hypertension    Multiple pulmonary nodules    Prostate cancer screening    Weight gain         Recent Results (from the past 672 hour(s))   POCT glycosylated hemoglobin (Hb A1C) manually resulted    Collection Time: 03/08/24  " "9:21 AM   Result Value Ref Range    POC HEMOGLOBIN A1c 8.3 (A) 4.2 - 6.5 %        Objective    Visit Vitals    Visit Vitals  /82   Pulse 72   Temp 36.7 °C (98.1 °F)   Resp 16   Ht 1.854 m (6' 1\")   Wt 103 kg (228 lb)   BMI 30.08 kg/m²   Smoking Status Former   BSA 2.3 m²       Body mass index is 30.08 kg/m².     Physical Exam   General - Not in acute distress and cooperative.  Build & Nutrition - Well developed  Posture - Normal  Gait - Normal  Mental Status - alert and oriented x 3    Head - Normocephalic    Neck - Thyroid normal size    Eyes - Bilateral - Sclera clear and lids pink without edema or mass.      Skin - Warm and dry with no rashes on visible skin    Lungs - Clear to auscultation and normal breathing effort    Cardiovascular - RRR and no murmurs, rubs or thrill.    Peripheral Vascular - Bilateral - no edema present    Neuropsychiatric - normal mood and affect        Assessment    1. Mixed hyperlipidemia        2. Type 2 diabetes mellitus without complication, without long-term current use of insulin (CMS/[facility])  POCT glycosylated hemoglobin (Hb A1C) manually resulted, empagliflozin (Jardiance) 10 mg   Suboptimal control of condition.  Will modify treatment by adding Jardiance 10 mg once daily.  He will continue with metformin 500 mg twice daily.  We will follow-up in 3 months for recheck.  We discussed ways to help lower his sugar with dietary changes and exercise also.     3. Primary hypertension        4. Weight gain  TSH with reflex to Free T4 if abnormal   We will check a TSH since there is a strong family history of low thyroid.         Orders Placed This Encounter      empagliflozin (Jardiance) 10 mg       Orders Placed This Encounter   Procedures    TSH with reflex to Free T4 if abnormal    POCT glycosylated hemoglobin (Hb A1C) manually resulted        New Medications Ordered This Visit   Medications    empagliflozin (Jardiance) 10 mg     Sig: Take 1 tablet (10 mg) by mouth once daily.     " Dispense:  90 tablet     Refill:  1

## 2024-03-25 PROBLEM — R73.09 INCREASED GLUCOSE LEVEL: Status: ACTIVE | Noted: 2024-03-25

## 2024-03-25 PROBLEM — E03.9 HYPOTHYROIDISM: Status: ACTIVE | Noted: 2024-03-25

## 2024-03-25 PROBLEM — Z86.711 HISTORY OF PULMONARY EMBOLISM: Status: ACTIVE | Noted: 2024-03-25

## 2024-03-25 PROBLEM — Z86.19 HISTORY OF VARICELLA: Status: ACTIVE | Noted: 2024-03-25

## 2024-03-25 RX ORDER — ATENOLOL 100 MG/1
100 TABLET ORAL
COMMUNITY
Start: 2023-11-03 | End: 2024-05-22 | Stop reason: WASHOUT

## 2024-05-22 ENCOUNTER — OFFICE VISIT (OUTPATIENT)
Dept: CARDIOLOGY | Facility: CLINIC | Age: 64
End: 2024-05-22
Payer: COMMERCIAL

## 2024-05-22 VITALS
BODY MASS INDEX: 28.76 KG/M2 | OXYGEN SATURATION: 96 % | HEIGHT: 73 IN | DIASTOLIC BLOOD PRESSURE: 81 MMHG | HEART RATE: 66 BPM | SYSTOLIC BLOOD PRESSURE: 132 MMHG | WEIGHT: 217 LBS

## 2024-05-22 DIAGNOSIS — I25.118 CORONARY ARTERY DISEASE OF NATIVE ARTERY OF NATIVE HEART WITH STABLE ANGINA PECTORIS (CMS-HCC): Primary | ICD-10-CM

## 2024-05-22 DIAGNOSIS — E78.2 MIXED HYPERLIPIDEMIA: ICD-10-CM

## 2024-05-22 PROCEDURE — 99213 OFFICE O/P EST LOW 20 MIN: CPT | Performed by: INTERNAL MEDICINE

## 2024-05-22 PROCEDURE — 1036F TOBACCO NON-USER: CPT | Performed by: INTERNAL MEDICINE

## 2024-05-22 PROCEDURE — 3075F SYST BP GE 130 - 139MM HG: CPT | Performed by: INTERNAL MEDICINE

## 2024-05-22 PROCEDURE — 3079F DIAST BP 80-89 MM HG: CPT | Performed by: INTERNAL MEDICINE

## 2024-05-22 ASSESSMENT — ENCOUNTER SYMPTOMS
OCCASIONAL FEELINGS OF UNSTEADINESS: 0
LOSS OF SENSATION IN FEET: 1
DEPRESSION: 0

## 2024-05-22 ASSESSMENT — PAIN SCALES - GENERAL: PAINLEVEL: 0-NO PAIN

## 2024-05-22 NOTE — PROGRESS NOTES
Name : Ryan Jimenez   : 1960   MRN : [id number]   ENC Date : 2024      Assessment and Plan:  Coronary artery disease: Patient is without angina.  Continue aspirin and statin therapy indefinitely.  No need for stress testing.  Ascending aortic aneurysm: This was stable at 4.1 cm and really was not seen on his coronary CTA.  I suspect is probably high normal.  No need for any specific follow-up at this point.  We may consider an echocardiogram in a couple of years unless he gets CAT scans for other reasons.  Disp: RTO in 1 year    HPI:  Patient returns today without chest pain tightness pressure or squeezing.  He is completely asymptomatic and doing all activities without any limitations.  No TIA or CVA-like symptoms.  He is tolerating rosuvastatin quite nicely.    Problem list overview:   Patient Active Problem List   Diagnosis    Ascending aortic aneurysm (CMS-HCC)    Contact dermatitis    Coronary artery disease    Type 2 diabetes mellitus without complication, without long-term current use of insulin (Multi)    Erectile dysfunction    H/O total knee replacement    Mixed hyperlipidemia    Primary hypertension    Multiple pulmonary nodules    Prostate cancer screening    Weight gain    History of pulmonary embolism    History of varicella    Hypothyroidism    Increased glucose level       Meds:   Current Outpatient Medications on File Prior to Visit   Medication Sig Dispense Refill    aspirin 81 mg chewable tablet Chew 1 tablet (81 mg) once daily. 30 tablet 11    dilTIAZem ER (Tiazac) 360 mg 24 hr capsule Take 1 capsule (360 mg) by mouth once daily. 90 capsule 1    empagliflozin (Jardiance) 10 mg Take 1 tablet (10 mg) by mouth once daily. 90 tablet 1    metFORMIN  mg 24 hr tablet Take 1 tablet (500 mg) by mouth 2 times a day with meals. 180 tablet 1    rosuvastatin (Crestor) 20 mg tablet Take 1 tablet (20 mg) by mouth once daily. 90 tablet 3    vardenafil (Levitra) 10 mg tablet Take 1 tablet  "(10 mg) by mouth if needed for erectile dysfunction. 18 tablet 1    [DISCONTINUED] atenolol (Tenormin) 100 mg tablet Take 1 tablet (100 mg) by mouth.       No current facility-administered medications on file prior to visit.        VS:  /81 (BP Location: Left arm, Patient Position: Sitting)   Pulse 66   Ht 1.854 m (6' 1\")   Wt 98.4 kg (217 lb)   SpO2 96%   BMI 28.63 kg/m²     Vitals reviewed.   Neck:      Vascular: No JVD.   Pulmonary:      Breath sounds: Normal breath sounds.   Cardiovascular:      Normal rate. Regular rhythm.      Murmurs: There is no murmur.      No gallop.    Edema:     Peripheral edema absent.   Abdominal:      General: Abdomen is flat.      Palpations: Abdomen is soft.   Skin:     General: Skin is warm.         ECG: No results found for this or any previous visit.   ECHO:         Kvng Alonzo MD  "

## 2024-05-30 DIAGNOSIS — E11.9 TYPE 2 DIABETES MELLITUS WITHOUT COMPLICATION, WITHOUT LONG-TERM CURRENT USE OF INSULIN (MULTI): ICD-10-CM

## 2024-05-30 RX ORDER — METFORMIN HYDROCHLORIDE 500 MG/1
500 TABLET, EXTENDED RELEASE ORAL
Qty: 180 TABLET | Refills: 3 | OUTPATIENT
Start: 2024-05-30

## 2024-06-13 ENCOUNTER — APPOINTMENT (OUTPATIENT)
Dept: PRIMARY CARE | Facility: CLINIC | Age: 64
End: 2024-06-13
Payer: COMMERCIAL

## 2024-06-13 ENCOUNTER — LAB (OUTPATIENT)
Dept: LAB | Facility: LAB | Age: 64
End: 2024-06-13
Payer: COMMERCIAL

## 2024-06-13 VITALS
DIASTOLIC BLOOD PRESSURE: 82 MMHG | HEIGHT: 73 IN | WEIGHT: 217 LBS | OXYGEN SATURATION: 98 % | RESPIRATION RATE: 18 BRPM | SYSTOLIC BLOOD PRESSURE: 128 MMHG | HEART RATE: 72 BPM | BODY MASS INDEX: 28.76 KG/M2 | TEMPERATURE: 97 F

## 2024-06-13 DIAGNOSIS — N52.9 ERECTILE DYSFUNCTION, UNSPECIFIED ERECTILE DYSFUNCTION TYPE: ICD-10-CM

## 2024-06-13 DIAGNOSIS — I10 PRIMARY HYPERTENSION: ICD-10-CM

## 2024-06-13 DIAGNOSIS — Z12.5 PROSTATE CANCER SCREENING: ICD-10-CM

## 2024-06-13 DIAGNOSIS — E11.9 TYPE 2 DIABETES MELLITUS WITHOUT COMPLICATION, WITHOUT LONG-TERM CURRENT USE OF INSULIN (MULTI): ICD-10-CM

## 2024-06-13 DIAGNOSIS — I10 PRIMARY HYPERTENSION: Primary | ICD-10-CM

## 2024-06-13 DIAGNOSIS — I25.118 CORONARY ARTERY DISEASE OF NATIVE ARTERY OF NATIVE HEART WITH STABLE ANGINA PECTORIS (CMS-HCC): ICD-10-CM

## 2024-06-13 DIAGNOSIS — E78.2 MIXED HYPERLIPIDEMIA: ICD-10-CM

## 2024-06-13 PROBLEM — R73.09 INCREASED GLUCOSE LEVEL: Status: RESOLVED | Noted: 2024-03-25 | Resolved: 2024-06-13

## 2024-06-13 LAB
ALBUMIN SERPL BCP-MCNC: 4.8 G/DL (ref 3.4–5)
ALP SERPL-CCNC: 61 U/L (ref 33–136)
ALT SERPL W P-5'-P-CCNC: 26 U/L (ref 10–52)
ANION GAP SERPL CALC-SCNC: 14 MMOL/L (ref 10–20)
AST SERPL W P-5'-P-CCNC: 17 U/L (ref 9–39)
BILIRUB SERPL-MCNC: 0.5 MG/DL (ref 0–1.2)
BUN SERPL-MCNC: 14 MG/DL (ref 6–23)
CALCIUM SERPL-MCNC: 10.1 MG/DL (ref 8.6–10.3)
CHLORIDE SERPL-SCNC: 105 MMOL/L (ref 98–107)
CHOLEST SERPL-MCNC: 142 MG/DL (ref 0–199)
CHOLESTEROL/HDL RATIO: 2.9
CO2 SERPL-SCNC: 23 MMOL/L (ref 21–32)
CREAT SERPL-MCNC: 0.83 MG/DL (ref 0.5–1.3)
EGFRCR SERPLBLD CKD-EPI 2021: >90 ML/MIN/1.73M*2
ERYTHROCYTE [DISTWIDTH] IN BLOOD BY AUTOMATED COUNT: 13.8 % (ref 11.5–14.5)
GLUCOSE SERPL-MCNC: 133 MG/DL (ref 74–99)
HCT VFR BLD AUTO: 49.5 % (ref 41–52)
HDLC SERPL-MCNC: 49.6 MG/DL
HGB BLD-MCNC: 16.2 G/DL (ref 13.5–17.5)
LDLC SERPL CALC-MCNC: 49 MG/DL
MCH RBC QN AUTO: 28.1 PG (ref 26–34)
MCHC RBC AUTO-ENTMCNC: 32.7 G/DL (ref 32–36)
MCV RBC AUTO: 86 FL (ref 80–100)
NON HDL CHOLESTEROL: 92 MG/DL (ref 0–149)
NRBC BLD-RTO: 0 /100 WBCS (ref 0–0)
PLATELET # BLD AUTO: 304 X10*3/UL (ref 150–450)
POC HEMOGLOBIN A1C: 6.9 % (ref 4.2–6.5)
POTASSIUM SERPL-SCNC: 4.8 MMOL/L (ref 3.5–5.3)
PROT SERPL-MCNC: 7.2 G/DL (ref 6.4–8.2)
PSA SERPL-MCNC: 1.48 NG/ML
RBC # BLD AUTO: 5.76 X10*6/UL (ref 4.5–5.9)
SODIUM SERPL-SCNC: 137 MMOL/L (ref 136–145)
TRIGL SERPL-MCNC: 217 MG/DL (ref 0–149)
VLDL: 43 MG/DL (ref 0–40)
WBC # BLD AUTO: 6.5 X10*3/UL (ref 4.4–11.3)

## 2024-06-13 PROCEDURE — 83036 HEMOGLOBIN GLYCOSYLATED A1C: CPT | Performed by: FAMILY MEDICINE

## 2024-06-13 PROCEDURE — 99214 OFFICE O/P EST MOD 30 MIN: CPT | Performed by: FAMILY MEDICINE

## 2024-06-13 PROCEDURE — 85027 COMPLETE CBC AUTOMATED: CPT

## 2024-06-13 PROCEDURE — 80061 LIPID PANEL: CPT

## 2024-06-13 PROCEDURE — 3079F DIAST BP 80-89 MM HG: CPT | Performed by: FAMILY MEDICINE

## 2024-06-13 PROCEDURE — 36415 COLL VENOUS BLD VENIPUNCTURE: CPT

## 2024-06-13 PROCEDURE — 1036F TOBACCO NON-USER: CPT | Performed by: FAMILY MEDICINE

## 2024-06-13 PROCEDURE — 84153 ASSAY OF PSA TOTAL: CPT

## 2024-06-13 PROCEDURE — 3074F SYST BP LT 130 MM HG: CPT | Performed by: FAMILY MEDICINE

## 2024-06-13 PROCEDURE — 80053 COMPREHEN METABOLIC PANEL: CPT

## 2024-06-13 RX ORDER — ROSUVASTATIN CALCIUM 20 MG/1
20 TABLET, COATED ORAL DAILY
Qty: 90 TABLET | Refills: 1 | Status: SHIPPED | OUTPATIENT
Start: 2024-06-13

## 2024-06-13 RX ORDER — VARDENAFIL HYDROCHLORIDE 10 MG/1
10 TABLET ORAL AS NEEDED
Qty: 18 TABLET | Refills: 1 | Status: SHIPPED | OUTPATIENT
Start: 2024-06-13

## 2024-06-13 RX ORDER — DILTIAZEM HYDROCHLORIDE 360 MG/1
360 CAPSULE, EXTENDED RELEASE ORAL DAILY
Qty: 90 CAPSULE | Refills: 1 | Status: SHIPPED | OUTPATIENT
Start: 2024-06-13

## 2024-06-13 RX ORDER — METFORMIN HYDROCHLORIDE 500 MG/1
500 TABLET, EXTENDED RELEASE ORAL
Qty: 180 TABLET | Refills: 1 | Status: SHIPPED | OUTPATIENT
Start: 2024-06-13

## 2024-06-13 ASSESSMENT — ENCOUNTER SYMPTOMS
OCCASIONAL FEELINGS OF UNSTEADINESS: 0
LOSS OF SENSATION IN FEET: 0
DEPRESSION: 0
HYPERTENSION: 1

## 2024-06-13 NOTE — PROGRESS NOTES
"Ryan Tidwell" is a 64 y.o. male here today for   Chief Complaint   Patient presents with    Hyperlipidemia    Hypertension    Diabetes        Hyperlipidemia  This is a chronic problem. The current episode started more than 1 year ago.   Hypertension  This is a chronic problem. The current episode started more than 1 year ago.   Diabetes  He presents for his follow-up diabetic visit. He has type 2 diabetes mellitus.      HTN recheck -- Patient denies chest pain, SOB, edema, palpitations on review.  Taking medication correctly and denies any side effects.    Diabetes recheck -- Patient feeling well.  No CP, numbness of feet, blurred vision, polyuria.  Trying to follow diet.  No side effects from medications.  No hypoglycemic symptoms.  His A1c has improved to 6.9 today.  We added Jardiance at his last visit 3 months ago when his A1c was 8.3.  He has lost about 9 pounds since 3 months ago.  More active and eating less junk.      HLD recheck -- Patient taking medications correctly.  No SE's of muscle pain or joint pain.  No CP, edema, myalgias.    Recheck erectile dysfunction -- well controlled with Levitra.        Current Outpatient Medications:     aspirin 81 mg chewable tablet, Chew 1 tablet (81 mg) once daily., Disp: 30 tablet, Rfl: 11    dilTIAZem ER (Tiazac) 360 mg 24 hr capsule, Take 1 capsule (360 mg) by mouth once daily., Disp: 90 capsule, Rfl: 1    empagliflozin (Jardiance) 10 mg, Take 1 tablet (10 mg) by mouth once daily., Disp: 90 tablet, Rfl: 1    metFORMIN  mg 24 hr tablet, Take 1 tablet (500 mg) by mouth 2 times daily (morning and late afternoon)., Disp: 180 tablet, Rfl: 1    rosuvastatin (Crestor) 20 mg tablet, Take 1 tablet (20 mg) by mouth once daily., Disp: 90 tablet, Rfl: 1    vardenafil (Levitra) 10 mg tablet, Take 1 tablet (10 mg) by mouth if needed for erectile dysfunction., Disp: 18 tablet, Rfl: 1    Patient Active Problem List   Diagnosis    Ascending aortic aneurysm (CMS-HCC) " "   Contact dermatitis    Coronary artery disease    Type 2 diabetes mellitus without complication, without long-term current use of insulin (Multi)    Erectile dysfunction    H/O total knee replacement    Mixed hyperlipidemia    Primary hypertension    Multiple pulmonary nodules    Prostate cancer screening    Weight gain    History of pulmonary embolism    History of varicella         Recent Results (from the past 672 hour(s))   POCT glycosylated hemoglobin (Hb A1C) manually resulted    Collection Time: 06/13/24  7:57 AM   Result Value Ref Range    POC HEMOGLOBIN A1c 6.9 (A) 4.2 - 6.5 %        Objective    Visit Vitals    Visit Vitals  /82   Pulse 72   Temp 36.1 °C (97 °F)   Resp 18   Ht 1.854 m (6' 1\")   Wt 98.4 kg (217 lb)   SpO2 98%   BMI 28.63 kg/m²   Smoking Status Former   BSA 2.25 m²       Body mass index is 28.63 kg/m².     Physical Exam     General - Not in acute distress and cooperative.  Build & Nutrition - Well developed  Posture - Normal  Gait - Normal  Mental Status - alert and oriented x 3    Head - Normocephalic    Neck - Thyroid normal size    Eyes - Bilateral - Sclera clear and lids pink without edema or mass.      Skin - Warm and dry with no rashes on visible skin    Lungs - Clear to auscultation and normal breathing effort    Cardiovascular - RRR and no murmurs, rubs or thrill.    Peripheral Vascular - Bilateral - no edema present    Neuropsychiatric - normal mood and affect       Assessment    1. Primary hypertension  dilTIAZem ER (Tiazac) 360 mg 24 hr capsule, Comprehensive Metabolic Panel, CBC, Lipid Panel   Condition well controlled.  No change in current treatment regimen.  Refill given of current medication.  Appropriate labs ordered or reviewed.  Make a follow up appointment with me for recheck in 6 months.       2. Type 2 diabetes mellitus without complication, without long-term current use of insulin (Multi)  POCT glycosylated hemoglobin (Hb A1C) manually resulted, empagliflozin " (Jardiance) 10 mg, metFORMIN  mg 24 hr tablet, Comprehensive Metabolic Panel, CBC, Lipid Panel   Condition well controlled.  No change in current treatment regimen.  Refill given of current medication.  Appropriate labs ordered or reviewed.  Make a follow up appointment with me for recheck in 6 months.       3. Coronary artery disease of native artery of native heart with stable angina pectoris (CMS-[facility])  rosuvastatin (Crestor) 20 mg tablet   Condition well controlled.  No change in current treatment regimen.  Refill given of current medication.  Appropriate labs ordered or reviewed.  Make a follow up appointment with me for recheck in 6 months.       4. Erectile dysfunction, unspecified erectile dysfunction type  vardenafil (Levitra) 10 mg tablet   Condition well controlled.  No change in current treatment regimen.  Refill given of current medication.  Make a follow up appointment with me for recheck in 6 months.       5. Mixed hyperlipidemia  Comprehensive Metabolic Panel, CBC, Lipid Panel   Condition well controlled.  No change in current treatment regimen.  Refill given of current medication.  Appropriate labs ordered or reviewed.  Make a follow up appointment with me for recheck in 6 months.       6. Prostate cancer screening  Prostate Specific Antigen            Orders Placed This Encounter      dilTIAZem ER (Tiazac) 360 mg 24 hr capsule      empagliflozin (Jardiance) 10 mg      metFORMIN  mg 24 hr tablet      rosuvastatin (Crestor) 20 mg tablet      vardenafil (Levitra) 10 mg tablet       Orders Placed This Encounter   Procedures    Comprehensive Metabolic Panel    CBC    Lipid Panel    Prostate Specific Antigen    POCT glycosylated hemoglobin (Hb A1C) manually resulted        New Medications Ordered This Visit   Medications    dilTIAZem ER (Tiazac) 360 mg 24 hr capsule     Sig: Take 1 capsule (360 mg) by mouth once daily.     Dispense:  90 capsule     Refill:  1    empagliflozin (Jardiance) 10 mg      Sig: Take 1 tablet (10 mg) by mouth once daily.     Dispense:  90 tablet     Refill:  1    metFORMIN  mg 24 hr tablet     Sig: Take 1 tablet (500 mg) by mouth 2 times daily (morning and late afternoon).     Dispense:  180 tablet     Refill:  1    rosuvastatin (Crestor) 20 mg tablet     Sig: Take 1 tablet (20 mg) by mouth once daily.     Dispense:  90 tablet     Refill:  1    vardenafil (Levitra) 10 mg tablet     Sig: Take 1 tablet (10 mg) by mouth if needed for erectile dysfunction.     Dispense:  18 tablet     Refill:  1

## 2024-09-13 ENCOUNTER — APPOINTMENT (OUTPATIENT)
Dept: PRIMARY CARE | Facility: CLINIC | Age: 64
End: 2024-09-13
Payer: COMMERCIAL

## 2024-09-13 VITALS
HEIGHT: 73 IN | WEIGHT: 211 LBS | TEMPERATURE: 97.8 F | BODY MASS INDEX: 27.96 KG/M2 | SYSTOLIC BLOOD PRESSURE: 132 MMHG | HEART RATE: 74 BPM | RESPIRATION RATE: 18 BRPM | DIASTOLIC BLOOD PRESSURE: 84 MMHG

## 2024-09-13 DIAGNOSIS — N52.9 ERECTILE DYSFUNCTION, UNSPECIFIED ERECTILE DYSFUNCTION TYPE: ICD-10-CM

## 2024-09-13 DIAGNOSIS — E11.9 TYPE 2 DIABETES MELLITUS WITHOUT COMPLICATION, WITHOUT LONG-TERM CURRENT USE OF INSULIN (MULTI): ICD-10-CM

## 2024-09-13 DIAGNOSIS — E78.2 MIXED HYPERLIPIDEMIA: Primary | ICD-10-CM

## 2024-09-13 DIAGNOSIS — I10 PRIMARY HYPERTENSION: ICD-10-CM

## 2024-09-13 DIAGNOSIS — I25.118 CORONARY ARTERY DISEASE OF NATIVE ARTERY OF NATIVE HEART WITH STABLE ANGINA PECTORIS (CMS-HCC): ICD-10-CM

## 2024-09-13 LAB — POC HEMOGLOBIN A1C: 7 % (ref 4.2–6.5)

## 2024-09-13 PROCEDURE — 3008F BODY MASS INDEX DOCD: CPT | Performed by: FAMILY MEDICINE

## 2024-09-13 PROCEDURE — 99214 OFFICE O/P EST MOD 30 MIN: CPT | Performed by: FAMILY MEDICINE

## 2024-09-13 PROCEDURE — 83036 HEMOGLOBIN GLYCOSYLATED A1C: CPT | Performed by: FAMILY MEDICINE

## 2024-09-13 PROCEDURE — 1036F TOBACCO NON-USER: CPT | Performed by: FAMILY MEDICINE

## 2024-09-13 PROCEDURE — 3075F SYST BP GE 130 - 139MM HG: CPT | Performed by: FAMILY MEDICINE

## 2024-09-13 PROCEDURE — 3079F DIAST BP 80-89 MM HG: CPT | Performed by: FAMILY MEDICINE

## 2024-09-13 PROCEDURE — 3048F LDL-C <100 MG/DL: CPT | Performed by: FAMILY MEDICINE

## 2024-09-13 RX ORDER — METFORMIN HYDROCHLORIDE 500 MG/1
500 TABLET, EXTENDED RELEASE ORAL
Qty: 180 TABLET | Refills: 1 | Status: SHIPPED | OUTPATIENT
Start: 2024-09-13

## 2024-09-13 RX ORDER — ROSUVASTATIN CALCIUM 20 MG/1
20 TABLET, COATED ORAL DAILY
Qty: 90 TABLET | Refills: 1 | Status: SHIPPED | OUTPATIENT
Start: 2024-09-13

## 2024-09-13 RX ORDER — DILTIAZEM HYDROCHLORIDE 360 MG/1
360 CAPSULE, EXTENDED RELEASE ORAL DAILY
Qty: 90 CAPSULE | Refills: 1 | Status: SHIPPED | OUTPATIENT
Start: 2024-09-13

## 2024-09-13 RX ORDER — VARDENAFIL HYDROCHLORIDE 20 MG/1
20 TABLET ORAL AS NEEDED
Qty: 15 TABLET | Refills: 1 | Status: SHIPPED | OUTPATIENT
Start: 2024-09-13

## 2024-09-13 ASSESSMENT — ENCOUNTER SYMPTOMS: HYPERTENSION: 1

## 2024-09-13 NOTE — PROGRESS NOTES
"Ryan Tidwell" is a 64 y.o. male here today for   Chief Complaint   Patient presents with    Hyperlipidemia    Hypertension    Diabetes        Hyperlipidemia  This is a chronic problem. The current episode started more than 1 year ago.   Hypertension  This is a chronic problem. The current episode started more than 1 year ago.   Diabetes  He presents for his follow-up diabetic visit. He has type 2 diabetes mellitus.      Diabetes recheck -- Patient feeling well.  No CP, numbness of feet, blurred vision, polyuria.  Trying to follow diet.  No side effects from medications.  No hypoglycemic symptoms.   Has been losing weight with better diet and exercise.  A1C is 7.0 today.      HTN recheck -- Patient denies chest pain, SOB, edema, palpitations on review.  Taking medication correctly and denies any side effects.  Not checking at home.      HLD recheck -- Patient taking medications correctly.  No SE's of muscle pain or joint pain.  No CP, edema, myalgias.    ED - meds work pretty well for ED but occasionally still not getting a firm erection.  He asks if we could increase the dose.  He denies any side effects or problems with the medication.      Current Outpatient Medications:     aspirin 81 mg chewable tablet, Chew 1 tablet (81 mg) once daily., Disp: 30 tablet, Rfl: 11    dilTIAZem ER (Tiazac) 360 mg 24 hr capsule, Take 1 capsule (360 mg) by mouth once daily., Disp: 90 capsule, Rfl: 1    empagliflozin (Jardiance) 10 mg, Take 1 tablet (10 mg) by mouth once daily., Disp: 90 tablet, Rfl: 1    metFORMIN  mg 24 hr tablet, Take 1 tablet (500 mg) by mouth 2 times daily (morning and late afternoon)., Disp: 180 tablet, Rfl: 1    rosuvastatin (Crestor) 20 mg tablet, Take 1 tablet (20 mg) by mouth once daily., Disp: 90 tablet, Rfl: 1    vardenafil (Levitra) 20 mg tablet, Take 1 tablet (20 mg) by mouth if needed for erectile dysfunction., Disp: 15 tablet, Rfl: 1    Patient Active Problem List   Diagnosis    " "Ascending aortic aneurysm (CMS-HCC)    Contact dermatitis    Coronary artery disease    Type 2 diabetes mellitus without complication, without long-term current use of insulin (Multi)    Erectile dysfunction    H/O total knee replacement    Mixed hyperlipidemia    Primary hypertension    Multiple pulmonary nodules    Prostate cancer screening    Weight gain    History of pulmonary embolism    History of varicella         Recent Results (from the past 672 hour(s))   POCT glycosylated hemoglobin (Hb A1C) manually resulted    Collection Time: 09/13/24  7:57 AM   Result Value Ref Range    POC HEMOGLOBIN A1c 7.0 (A) 4.2 - 6.5 %        Objective    Visit Vitals    Visit Vitals  /84   Pulse 74   Temp 36.6 °C (97.8 °F)   Resp 18   Ht 1.854 m (6' 1\")   Wt 95.7 kg (211 lb)   BMI 27.84 kg/m²   Smoking Status Former   BSA 2.22 m²       Body mass index is 27.84 kg/m².     Physical Exam   General - Not in acute distress and cooperative.  Build & Nutrition - Well developed  Posture - Normal  Gait - Normal  Mental Status - alert and oriented x 3    Head - Normocephalic    Neck - Thyroid normal size    Eyes - Bilateral - Sclera clear and lids pink without edema or mass.      Skin - Warm and dry with no rashes on visible skin    Lungs - Clear to auscultation and normal breathing effort    Cardiovascular - RRR and no murmurs, rubs or thrill.    Peripheral Vascular - Bilateral - no edema present    Neuropsychiatric - normal mood and affect        Assessment    1. Mixed hyperlipidemia  rosuvastatin (Crestor) 20 mg tablet   Condition well controlled.  No change in current treatment regimen.  Refill given of current medication.  Appropriate labs ordered or reviewed.  Make a follow up appointment with me for recheck in 6 months.       2. Type 2 diabetes mellitus without complication, without long-term current use of insulin (Multi)  POCT glycosylated hemoglobin (Hb A1C) manually resulted, empagliflozin (Jardiance) 10 mg, metFORMIN XR " 500 mg 24 hr tablet   Condition well controlled.  No change in current treatment regimen.  Refill given of current medication.  Appropriate labs ordered or reviewed.  Make a follow up appointment with me for recheck in 6 months.       3. Primary hypertension  dilTIAZem ER (Tiazac) 360 mg 24 hr capsule   Condition well controlled.  No change in current treatment regimen.  Refill given of current medication.  Appropriate labs ordered or reviewed.  Make a follow up appointment with me for recheck in 6 months.       4. Coronary artery disease of native artery of native heart with stable angina pectoris (CMS-[facility])  rosuvastatin (Crestor) 20 mg tablet      5. Erectile dysfunction, unspecified erectile dysfunction type  vardenafil (Levitra) 20 mg tablet   We will increase Levitra up to 20 mg as needed.           Orders Placed This Encounter      dilTIAZem ER (Tiazac) 360 mg 24 hr capsule      empagliflozin (Jardiance) 10 mg      metFORMIN  mg 24 hr tablet      rosuvastatin (Crestor) 20 mg tablet      vardenafil (Levitra) 20 mg tablet       Orders Placed This Encounter   Procedures    POCT glycosylated hemoglobin (Hb A1C) manually resulted        New Medications Ordered This Visit   Medications    dilTIAZem ER (Tiazac) 360 mg 24 hr capsule     Sig: Take 1 capsule (360 mg) by mouth once daily.     Dispense:  90 capsule     Refill:  1    empagliflozin (Jardiance) 10 mg     Sig: Take 1 tablet (10 mg) by mouth once daily.     Dispense:  90 tablet     Refill:  1    metFORMIN  mg 24 hr tablet     Sig: Take 1 tablet (500 mg) by mouth 2 times daily (morning and late afternoon).     Dispense:  180 tablet     Refill:  1    rosuvastatin (Crestor) 20 mg tablet     Sig: Take 1 tablet (20 mg) by mouth once daily.     Dispense:  90 tablet     Refill:  1    vardenafil (Levitra) 20 mg tablet     Sig: Take 1 tablet (20 mg) by mouth if needed for erectile dysfunction.     Dispense:  15 tablet     Refill:  1

## 2025-01-24 ENCOUNTER — APPOINTMENT (OUTPATIENT)
Dept: CARDIOLOGY | Facility: CLINIC | Age: 65
End: 2025-01-24
Payer: COMMERCIAL

## 2025-02-12 ENCOUNTER — OFFICE VISIT (OUTPATIENT)
Dept: CARDIOLOGY | Facility: CLINIC | Age: 65
End: 2025-02-12
Payer: COMMERCIAL

## 2025-02-12 VITALS
BODY MASS INDEX: 28.1 KG/M2 | HEIGHT: 73 IN | DIASTOLIC BLOOD PRESSURE: 80 MMHG | WEIGHT: 212 LBS | OXYGEN SATURATION: 95 % | SYSTOLIC BLOOD PRESSURE: 133 MMHG | HEART RATE: 77 BPM

## 2025-02-12 DIAGNOSIS — I25.118 CORONARY ARTERY DISEASE OF NATIVE ARTERY OF NATIVE HEART WITH STABLE ANGINA PECTORIS: Primary | ICD-10-CM

## 2025-02-12 DIAGNOSIS — I10 PRIMARY HYPERTENSION: ICD-10-CM

## 2025-02-12 DIAGNOSIS — E78.2 MIXED HYPERLIPIDEMIA: ICD-10-CM

## 2025-02-12 LAB
ATRIAL RATE: 70 BPM
P AXIS: 5 DEGREES
P OFFSET: 180 MS
P ONSET: 140 MS
PR INTERVAL: 140 MS
Q ONSET: 210 MS
QRS COUNT: 11 BEATS
QRS DURATION: 94 MS
QT INTERVAL: 402 MS
QTC CALCULATION(BAZETT): 434 MS
QTC FREDERICIA: 423 MS
R AXIS: -30 DEGREES
T AXIS: 6 DEGREES
T OFFSET: 411 MS
VENTRICULAR RATE: 70 BPM

## 2025-02-12 PROCEDURE — 3075F SYST BP GE 130 - 139MM HG: CPT | Performed by: INTERNAL MEDICINE

## 2025-02-12 PROCEDURE — 1036F TOBACCO NON-USER: CPT | Performed by: INTERNAL MEDICINE

## 2025-02-12 PROCEDURE — 3079F DIAST BP 80-89 MM HG: CPT | Performed by: INTERNAL MEDICINE

## 2025-02-12 PROCEDURE — 3008F BODY MASS INDEX DOCD: CPT | Performed by: INTERNAL MEDICINE

## 2025-02-12 PROCEDURE — 99214 OFFICE O/P EST MOD 30 MIN: CPT | Performed by: INTERNAL MEDICINE

## 2025-02-12 PROCEDURE — 93005 ELECTROCARDIOGRAM TRACING: CPT | Performed by: INTERNAL MEDICINE

## 2025-02-12 ASSESSMENT — PAIN SCALES - GENERAL: PAINLEVEL_OUTOF10: 0-NO PAIN

## 2025-02-12 NOTE — PROGRESS NOTES
Name : Ryan Jimenez   : 1960   MRN : [id number]   ENC Date : 2025      Assessment and Plan:  Coronary artery disease: Patient has mild CAD by coronary CTA.  He is asymptomatic.  EKG is unremarkable.  No need for stress testing or any further evaluation.  Continue statin therapy indefinitely.  Cardiac risk assessment for knee surgery.  Patient should be low risk for cardiac complications from knee surgery.  No further testing is needed.  Aortic root enlargement: Patient probably has borderline to high normal aortic root size.  Plan was to repeat imaging in a few years.  No need to repeat any imaging at this point.  Hypertension: Blood pressure is well-controlled.  Recommend no changes  Disp: RTO in 1 year or sooner if needed    HPI:  Patient returns earlier than expected needing cardiac risk assessment for knee surgery.  Patient remains asymptomatic.  No TIA or CVA-like symptoms.  No anginal symptoms.  He is participating in PT for his knee without any anginal symptoms.  No syncopal events.  He is tolerating rosuvastatin well.    Problem list overview:   Patient Active Problem List   Diagnosis    Ascending aortic aneurysm (CMS-HCC)    Contact dermatitis    Coronary artery disease    Type 2 diabetes mellitus without complication, without long-term current use of insulin (Multi)    Erectile dysfunction    H/O total knee replacement    Mixed hyperlipidemia    Primary hypertension    Multiple pulmonary nodules    Prostate cancer screening    Weight gain    History of pulmonary embolism    History of varicella       Meds:   Current Outpatient Medications on File Prior to Visit   Medication Sig Dispense Refill    dilTIAZem ER (Tiazac) 360 mg 24 hr capsule Take 1 capsule (360 mg) by mouth once daily. 90 capsule 1    empagliflozin (Jardiance) 10 mg Take 1 tablet (10 mg) by mouth once daily. 90 tablet 1    metFORMIN  mg 24 hr tablet Take 1 tablet (500 mg) by mouth 2 times daily (morning and late  "afternoon). 180 tablet 1    rosuvastatin (Crestor) 20 mg tablet Take 1 tablet (20 mg) by mouth once daily. 90 tablet 1    vardenafil (Levitra) 20 mg tablet Take 1 tablet (20 mg) by mouth if needed for erectile dysfunction. 15 tablet 1     No current facility-administered medications on file prior to visit.        VS:  /80 (BP Location: Left arm, Patient Position: Sitting, BP Cuff Size: Adult)   Pulse 77   Ht 1.854 m (6' 1\")   Wt 96.2 kg (212 lb)   SpO2 95%   BMI 27.97 kg/m²     Vitals reviewed.   Neck:      Vascular: No JVD.   Pulmonary:      Breath sounds: Normal breath sounds.   Cardiovascular:      Normal rate. Regular rhythm.      Murmurs: There is no murmur.      No gallop.    Edema:     Peripheral edema absent.   Abdominal:      General: Abdomen is flat.      Palpations: Abdomen is soft.   Skin:     General: Skin is warm.         ECG: No results found for this or any previous visit.   ECHO:    CT Results:  No results found for this or any previous visit from the past 365 days.      Kvng Alonzo MD  "

## 2025-03-18 ENCOUNTER — APPOINTMENT (OUTPATIENT)
Dept: PRIMARY CARE | Facility: CLINIC | Age: 65
End: 2025-03-18
Payer: COMMERCIAL

## 2025-03-18 VITALS
HEART RATE: 68 BPM | HEIGHT: 73 IN | TEMPERATURE: 97.8 F | RESPIRATION RATE: 18 BRPM | DIASTOLIC BLOOD PRESSURE: 88 MMHG | BODY MASS INDEX: 28.23 KG/M2 | SYSTOLIC BLOOD PRESSURE: 132 MMHG | WEIGHT: 213 LBS

## 2025-03-18 DIAGNOSIS — E78.2 MIXED HYPERLIPIDEMIA: ICD-10-CM

## 2025-03-18 DIAGNOSIS — I10 PRIMARY HYPERTENSION: ICD-10-CM

## 2025-03-18 DIAGNOSIS — E11.9 TYPE 2 DIABETES MELLITUS WITHOUT COMPLICATION, WITHOUT LONG-TERM CURRENT USE OF INSULIN (MULTI): ICD-10-CM

## 2025-03-18 DIAGNOSIS — I25.118 CORONARY ARTERY DISEASE OF NATIVE ARTERY OF NATIVE HEART WITH STABLE ANGINA PECTORIS: ICD-10-CM

## 2025-03-18 LAB — POC HEMOGLOBIN A1C: 7.5 % (ref 4.2–6.5)

## 2025-03-18 PROCEDURE — 83036 HEMOGLOBIN GLYCOSYLATED A1C: CPT | Performed by: FAMILY MEDICINE

## 2025-03-18 PROCEDURE — 3008F BODY MASS INDEX DOCD: CPT | Performed by: FAMILY MEDICINE

## 2025-03-18 PROCEDURE — 99214 OFFICE O/P EST MOD 30 MIN: CPT | Performed by: FAMILY MEDICINE

## 2025-03-18 PROCEDURE — 3075F SYST BP GE 130 - 139MM HG: CPT | Performed by: FAMILY MEDICINE

## 2025-03-18 PROCEDURE — 1036F TOBACCO NON-USER: CPT | Performed by: FAMILY MEDICINE

## 2025-03-18 PROCEDURE — 3079F DIAST BP 80-89 MM HG: CPT | Performed by: FAMILY MEDICINE

## 2025-03-18 RX ORDER — METFORMIN HYDROCHLORIDE 500 MG/1
500 TABLET, EXTENDED RELEASE ORAL
Qty: 180 TABLET | Refills: 1 | Status: SHIPPED | OUTPATIENT
Start: 2025-03-18

## 2025-03-18 RX ORDER — ROSUVASTATIN CALCIUM 20 MG/1
20 TABLET, COATED ORAL DAILY
Qty: 90 TABLET | Refills: 1 | Status: SHIPPED | OUTPATIENT
Start: 2025-03-18

## 2025-03-18 RX ORDER — DILTIAZEM HYDROCHLORIDE 360 MG/1
360 CAPSULE, EXTENDED RELEASE ORAL DAILY
Qty: 90 CAPSULE | Refills: 1 | Status: SHIPPED | OUTPATIENT
Start: 2025-03-18

## 2025-03-18 NOTE — ASSESSMENT & PLAN NOTE
Condition well controlled.  No change in current treatment regimen.  Refill given of current medication.  Appropriate labs ordered or reviewed.  Make a follow up appointment with me for recheck in 6 months.  Orders:    rosuvastatin (Crestor) 20 mg tablet; Take 1 tablet (20 mg) by mouth once daily.    CBC; Future    Comprehensive Metabolic Panel; Future    Lipid Panel; Future

## 2025-03-18 NOTE — ASSESSMENT & PLAN NOTE
His A1c is suboptimally controlled today at 7.5.  We discussed options including increasing medications.  He would like to work harder on his diet and exercise and recheck in 6 months and continue the same medications for now.  I think this is reasonable.  We discussed his diet and carbohydrate intake.  We will recheck in 6 months and refill his medicines.  Orders:    POCT glycosylated hemoglobin (Hb A1C) manually resulted    empagliflozin (Jardiance) 10 mg; Take 1 tablet (10 mg) by mouth once daily.    metFORMIN  mg 24 hr tablet; Take 1 tablet (500 mg) by mouth 2 times daily (morning and late afternoon).    CBC; Future    Comprehensive Metabolic Panel; Future    Lipid Panel; Future    Albumin-Creatinine Ratio, Urine Random; Future

## 2025-03-18 NOTE — ASSESSMENT & PLAN NOTE
Condition well controlled.  No change in current treatment regimen.  Refill given of current medication.  Appropriate labs ordered or reviewed.  Make a follow up appointment with me for recheck in 6 months.  Orders:    dilTIAZem ER (Tiazac) 360 mg 24 hr capsule; Take 1 capsule (360 mg) by mouth once daily.    CBC; Future    Comprehensive Metabolic Panel; Future    Lipid Panel; Future    Albumin-Creatinine Ratio, Urine Random; Future

## 2025-03-18 NOTE — PROGRESS NOTES
"Ryan Jimenez \"Ivan" is a 65 y.o. male here today for   Chief Complaint   Patient presents with    Hyperlipidemia    Hypertension    Diabetes        HPI     HLD recheck -- Patient taking medications correctly.  No SE's of muscle pain or joint pain.  No CP, edema, myalgias.      HTN recheck -- Patient denies chest pain, SOB, edema, palpitations on review.  Taking medication correctly and denies any side effects.    Diabetes recheck -- Patient feeling well.  No CP, numbness of feet, blurred vision, polyuria.  Trying to follow diet.  No side effects from medications.  No hypoglycemic symptoms.  Diet not as good as previous.  A1C increased to 7.5 today.      Left knee OA - TKR planned on April 4th with  ortho.        Current Outpatient Medications:     vardenafil (Levitra) 20 mg tablet, Take 1 tablet (20 mg) by mouth if needed for erectile dysfunction., Disp: 15 tablet, Rfl: 1    dilTIAZem ER (Tiazac) 360 mg 24 hr capsule, Take 1 capsule (360 mg) by mouth once daily., Disp: 90 capsule, Rfl: 1    empagliflozin (Jardiance) 10 mg, Take 1 tablet (10 mg) by mouth once daily., Disp: 90 tablet, Rfl: 1    metFORMIN  mg 24 hr tablet, Take 1 tablet (500 mg) by mouth 2 times daily (morning and late afternoon)., Disp: 180 tablet, Rfl: 1    rosuvastatin (Crestor) 20 mg tablet, Take 1 tablet (20 mg) by mouth once daily., Disp: 90 tablet, Rfl: 1    Patient Active Problem List   Diagnosis    Ascending aortic aneurysm (CMS-HCC)    Contact dermatitis    Coronary artery disease    Type 2 diabetes mellitus without complication, without long-term current use of insulin (Multi)    Erectile dysfunction    H/O total knee replacement    Mixed hyperlipidemia    Primary hypertension    Multiple pulmonary nodules    Prostate cancer screening    Weight gain    History of pulmonary embolism    History of varicella         Objective    Visit Vitals    Visit Vitals  /88   Pulse 68   Temp 36.6 °C (97.8 °F)   Resp 18   Ht 1.854 m (6' " "1\")   Wt 96.6 kg (213 lb)   BMI 28.10 kg/m²   Smoking Status Former   BSA 2.23 m²       Body mass index is 28.1 kg/m².     Physical Exam     General - Not in acute distress and cooperative.  Build & Nutrition - Well developed  Posture - Normal  Gait - Normal  Mental Status - alert and oriented x 3    Head - Normocephalic    Neck - Thyroid normal size    Eyes - Bilateral - Sclera clear and lids pink without edema or mass.      Skin - Warm and dry with no rashes on visible skin    Lungs - Clear to auscultation and normal breathing effort    Cardiovascular - RRR and no murmurs, rubs or thrill.    Peripheral Vascular - Bilateral - no edema present    Neuropsychiatric - normal mood and affect        Assessment & Plan  Type 2 diabetes mellitus without complication, without long-term current use of insulin (Multi)  His A1c is suboptimally controlled today at 7.5.  We discussed options including increasing medications.  He would like to work harder on his diet and exercise and recheck in 6 months and continue the same medications for now.  I think this is reasonable.  We discussed his diet and carbohydrate intake.  We will recheck in 6 months and refill his medicines.  Orders:    POCT glycosylated hemoglobin (Hb A1C) manually resulted    empagliflozin (Jardiance) 10 mg; Take 1 tablet (10 mg) by mouth once daily.    metFORMIN  mg 24 hr tablet; Take 1 tablet (500 mg) by mouth 2 times daily (morning and late afternoon).    CBC; Future    Comprehensive Metabolic Panel; Future    Lipid Panel; Future    Albumin-Creatinine Ratio, Urine Random; Future    Primary hypertension  Condition well controlled.  No change in current treatment regimen.  Refill given of current medication.  Appropriate labs ordered or reviewed.  Make a follow up appointment with me for recheck in 6 months.  Orders:    dilTIAZem ER (Tiazac) 360 mg 24 hr capsule; Take 1 capsule (360 mg) by mouth once daily.    CBC; Future    Comprehensive Metabolic Panel; " Future    Lipid Panel; Future    Albumin-Creatinine Ratio, Urine Random; Future    Coronary artery disease of native artery of native heart with stable angina pectoris    Orders:    rosuvastatin (Crestor) 20 mg tablet; Take 1 tablet (20 mg) by mouth once daily.    Mixed hyperlipidemia  Condition well controlled.  No change in current treatment regimen.  Refill given of current medication.  Appropriate labs ordered or reviewed.  Make a follow up appointment with me for recheck in 6 months.  Orders:    rosuvastatin (Crestor) 20 mg tablet; Take 1 tablet (20 mg) by mouth once daily.    CBC; Future    Comprehensive Metabolic Panel; Future    Lipid Panel; Future         Orders Placed This Encounter      dilTIAZem ER (Tiazac) 360 mg 24 hr capsule      empagliflozin (Jardiance) 10 mg      metFORMIN  mg 24 hr tablet      rosuvastatin (Crestor) 20 mg tablet       Orders Placed This Encounter   Procedures    CBC    Comprehensive Metabolic Panel    Lipid Panel    Albumin-Creatinine Ratio, Urine Random    POCT glycosylated hemoglobin (Hb A1C) manually resulted        New Medications Ordered This Visit   Medications    dilTIAZem ER (Tiazac) 360 mg 24 hr capsule     Sig: Take 1 capsule (360 mg) by mouth once daily.     Dispense:  90 capsule     Refill:  1    empagliflozin (Jardiance) 10 mg     Sig: Take 1 tablet (10 mg) by mouth once daily.     Dispense:  90 tablet     Refill:  1    rosuvastatin (Crestor) 20 mg tablet     Sig: Take 1 tablet (20 mg) by mouth once daily.     Dispense:  90 tablet     Refill:  1    metFORMIN  mg 24 hr tablet     Sig: Take 1 tablet (500 mg) by mouth 2 times daily (morning and late afternoon).     Dispense:  180 tablet     Refill:  1

## 2025-03-19 LAB
ALBUMIN SERPL-MCNC: 4.9 G/DL (ref 3.6–5.1)
ALBUMIN/CREAT UR: 7 MG/G CREAT
ALP SERPL-CCNC: 64 U/L (ref 35–144)
ALT SERPL-CCNC: 27 U/L (ref 9–46)
ANION GAP SERPL CALCULATED.4IONS-SCNC: 9 MMOL/L (CALC) (ref 7–17)
AST SERPL-CCNC: 19 U/L (ref 10–35)
BILIRUB SERPL-MCNC: 0.7 MG/DL (ref 0.2–1.2)
BUN SERPL-MCNC: 15 MG/DL (ref 7–25)
CALCIUM SERPL-MCNC: 10.1 MG/DL (ref 8.6–10.3)
CHLORIDE SERPL-SCNC: 104 MMOL/L (ref 98–110)
CHOLEST SERPL-MCNC: 157 MG/DL
CHOLEST/HDLC SERPL: 2.8 (CALC)
CO2 SERPL-SCNC: 27 MMOL/L (ref 20–32)
CREAT SERPL-MCNC: 0.79 MG/DL (ref 0.7–1.35)
CREAT UR-MCNC: 81 MG/DL (ref 20–320)
EGFRCR SERPLBLD CKD-EPI 2021: 99 ML/MIN/1.73M2
ERYTHROCYTE [DISTWIDTH] IN BLOOD BY AUTOMATED COUNT: 15 % (ref 11–15)
GLUCOSE SERPL-MCNC: 120 MG/DL (ref 65–99)
HCT VFR BLD AUTO: 50.2 % (ref 38.5–50)
HDLC SERPL-MCNC: 57 MG/DL
HGB BLD-MCNC: 16.7 G/DL (ref 13.2–17.1)
LDLC SERPL CALC-MCNC: 72 MG/DL (CALC)
MCH RBC QN AUTO: 28.3 PG (ref 27–33)
MCHC RBC AUTO-ENTMCNC: 33.3 G/DL (ref 32–36)
MCV RBC AUTO: 84.9 FL (ref 80–100)
MICROALBUMIN UR-MCNC: 0.6 MG/DL
NONHDLC SERPL-MCNC: 100 MG/DL (CALC)
PLATELET # BLD AUTO: 243 THOUSAND/UL (ref 140–400)
PMV BLD REES-ECKER: 10.2 FL (ref 7.5–12.5)
POTASSIUM SERPL-SCNC: 4.5 MMOL/L (ref 3.5–5.3)
PROT SERPL-MCNC: 7.6 G/DL (ref 6.1–8.1)
RBC # BLD AUTO: 5.91 MILLION/UL (ref 4.2–5.8)
SODIUM SERPL-SCNC: 140 MMOL/L (ref 135–146)
TRIGL SERPL-MCNC: 188 MG/DL
WBC # BLD AUTO: 6.2 THOUSAND/UL (ref 3.8–10.8)

## 2025-03-25 LAB
NON-UH HIE APPEARANCE, U: ABNORMAL
NON-UH HIE BILIRUBIN, U: ABNORMAL
NON-UH HIE BLOOD, U: ABNORMAL
NON-UH HIE COLOR, U: ABNORMAL
NON-UH HIE GLUCOSE QUAL, U: ABNORMAL
NON-UH HIE KETONES, U: ABNORMAL
NON-UH HIE LEUKOCYTE ESTERASE, U: ABNORMAL
NON-UH HIE NITRITE, U: ABNORMAL
NON-UH HIE PH, U: 5 (ref 4.5–8)
NON-UH HIE PROTEIN, U: ABNORMAL
NON-UH HIE SPECIFIC GRAVITY, U: 1.03 (ref 1–1.03)
NON-UH HIE U MICRO: ABNORMAL
NON-UH HIE UROBILINOGEN QUAL, U: ABNORMAL

## 2025-05-28 ENCOUNTER — APPOINTMENT (OUTPATIENT)
Dept: CARDIOLOGY | Facility: CLINIC | Age: 65
End: 2025-05-28
Payer: COMMERCIAL

## 2025-09-18 ENCOUNTER — APPOINTMENT (OUTPATIENT)
Dept: PRIMARY CARE | Facility: CLINIC | Age: 65
End: 2025-09-18
Payer: COMMERCIAL

## 2026-02-18 ENCOUNTER — APPOINTMENT (OUTPATIENT)
Dept: CARDIOLOGY | Facility: CLINIC | Age: 66
End: 2026-02-18
Payer: COMMERCIAL